# Patient Record
Sex: MALE | Race: WHITE | NOT HISPANIC OR LATINO | Employment: OTHER | URBAN - METROPOLITAN AREA
[De-identification: names, ages, dates, MRNs, and addresses within clinical notes are randomized per-mention and may not be internally consistent; named-entity substitution may affect disease eponyms.]

---

## 2017-01-05 ENCOUNTER — OFFICE VISIT (OUTPATIENT)
Dept: PULMONOLOGY | Facility: CLINIC | Age: 52
End: 2017-01-05

## 2017-01-05 VITALS
OXYGEN SATURATION: 97 % | RESPIRATION RATE: 16 BRPM | DIASTOLIC BLOOD PRESSURE: 72 MMHG | SYSTOLIC BLOOD PRESSURE: 118 MMHG | HEART RATE: 80 BPM | BODY MASS INDEX: 34.24 KG/M2 | TEMPERATURE: 97.8 F | WEIGHT: 252.8 LBS | HEIGHT: 72 IN

## 2017-01-05 DIAGNOSIS — I10 ESSENTIAL HYPERTENSION: ICD-10-CM

## 2017-01-05 DIAGNOSIS — G89.29 CHRONIC MIDLINE LOW BACK PAIN, WITH SCIATICA PRESENCE UNSPECIFIED: ICD-10-CM

## 2017-01-05 DIAGNOSIS — M54.5 CHRONIC MIDLINE LOW BACK PAIN, WITH SCIATICA PRESENCE UNSPECIFIED: ICD-10-CM

## 2017-01-05 DIAGNOSIS — D86.9 SARCOIDOSIS: Primary | ICD-10-CM

## 2017-01-05 PROCEDURE — 94375 RESPIRATORY FLOW VOLUME LOOP: CPT | Performed by: INTERNAL MEDICINE

## 2017-01-05 PROCEDURE — 94726 PLETHYSMOGRAPHY LUNG VOLUMES: CPT | Performed by: INTERNAL MEDICINE

## 2017-01-05 PROCEDURE — 94729 DIFFUSING CAPACITY: CPT | Performed by: INTERNAL MEDICINE

## 2017-01-05 PROCEDURE — 99214 OFFICE O/P EST MOD 30 MIN: CPT | Performed by: INTERNAL MEDICINE

## 2017-01-05 RX ORDER — SIMVASTATIN 40 MG
40 TABLET ORAL DAILY
COMMUNITY
Start: 2017-01-04

## 2017-01-05 RX ORDER — CHLORAL HYDRATE 500 MG
CAPSULE ORAL 2 TIMES DAILY
COMMUNITY
Start: 2013-05-20

## 2017-01-05 RX ORDER — SILDENAFIL CITRATE 20 MG/1
TABLET ORAL AS NEEDED
Refills: 0 | COMMUNITY
Start: 2016-11-15

## 2017-01-05 RX ORDER — HYDROCHLOROTHIAZIDE 25 MG/1
1 TABLET ORAL DAILY
COMMUNITY
Start: 2017-01-04

## 2017-01-05 RX ORDER — ASPIRIN 81 MG/1
1 TABLET ORAL DAILY
COMMUNITY
Start: 2017-01-04

## 2017-01-05 RX ORDER — GLIPIZIDE 5 MG/1
1 TABLET ORAL DAILY
COMMUNITY
Start: 2017-01-04

## 2017-01-05 RX ORDER — FLUTICASONE PROPIONATE 50 MCG
SPRAY, SUSPENSION (ML) NASAL AS NEEDED
COMMUNITY
Start: 2013-04-18

## 2017-01-05 RX ORDER — SITAGLIPTIN 100 MG/1
100 TABLET, FILM COATED ORAL DAILY
COMMUNITY
Start: 2017-01-04

## 2017-01-05 RX ORDER — ALLOPURINOL 300 MG/1
1 TABLET ORAL DAILY
COMMUNITY
Start: 2017-01-04

## 2017-01-05 RX ORDER — OMEPRAZOLE 20 MG/1
20 CAPSULE, DELAYED RELEASE ORAL DAILY
COMMUNITY
Start: 2017-01-04

## 2017-01-05 RX ORDER — LISINOPRIL 10 MG/1
10 TABLET ORAL DAILY
COMMUNITY
Start: 2017-01-04

## 2017-01-05 NOTE — MR AVS SNAPSHOT
Juan Frussel Beck   1/5/2017 11:15 AM   Office Visit    Dept Phone:  [phone]   Encounter #:  [id number]    Provider:  Himanshu Lee MD   Department:  [facility]                Your Full Care Plan              Your Updated Medication List          This list is accurate as of: 1/5/17 11:26 AM.  Always use your most recent med list.                allopurinol 300 MG tablet   Commonly known as:  ZYLOPRIM       ASPIR-LOW 81 MG EC tablet   Generic drug:  aspirin       fish oil 1000 MG capsule capsule       fluticasone 50 MCG/ACT nasal spray   Commonly known as:  FLONASE       glipiZIDE 5 MG tablet   Commonly known as:  GLUCOTROL       hydrochlorothiazide 25 MG tablet   Commonly known as:  HYDRODIURIL       JANUVIA 100 MG tablet   Generic drug:  SITagliptin       lisinopril 10 MG tablet   Commonly known as:  PRINIVIL,ZESTRIL       omeprazole 20 MG capsule   Commonly known as:  priLOSEC       ONE TOUCH ULTRA TEST test strip   Generic drug:  glucose blood       sildenafil 20 MG tablet   Commonly known as:  REVATIO       simvastatin 40 MG tablet   Commonly known as:  ZOCOR               We Performed the Following     Pulmonary Function Test     XR Chest PA & Lateral       You Were Diagnosed With        Codes Comments    Sarcoidosis    -  Primary ICD-10-CM: D86.9  ICD-9-CM: 135       Instructions     None    Patient Instructions History      Upcoming Appointments     Visit Type Date Time Department    FULL PFT 1/5/2017 10:30 AM [facility]    CHEST X-RAY 1/5/2017 11:00 AM [facility]    FOLLOW UP 1/5/2017 11:15 AM [facility]    XR CHEST PA AND LATERAL 1/5/2017 10:20 AM [facility]      MyChart Signup     Our records indicate that you have declined [facility] signup. If you would like to sign up for [facility], please email [email or web address] or call [phone] to obtain an activation code.              "  Other Info from Your Visit           Allergies     No Known Allergies      Reason for Visit     Sarcoidosis           Vital Signs     Blood Pressure Pulse Temperature Respirations Height Weight    118/72 80 97.8 °F (36.6 °C) 16 72\" (182.9 cm) 252 lb 12.8 oz (115 kg)    Oxygen Saturation Body Mass Index Smoking Status             97% 34.29 kg/m2 Never Smoker         Problems and Diagnoses Noted     Sarcoidosis      Results     Pulmonary Function Test           XR Chest PA & Lateral               "

## 2017-01-05 NOTE — LETTER
January 6, 2017     Yifan Andrews MD  [address]  [facility] [address]    Patient: Juan F Beck   YOB: 1965   Date of Visit: 1/5/2017       Dear Dr. Juliana MD:    Juan F Beck was in my office today. Below is a copy of my note.    If you have questions, please do not hesitate to call me. I look forward to following Juan F along with you.         Sincerely,        Himanshu Lee MD        CC: No Recipients      PULMONARY  NOTE    Chief Complaint     History of sarcoidosis, possible obstructive sleep apnea, chronic back pain, diabetes and hypertension    History of Present Illness     51-year-old white male returns today for follow-up.  I last saw him about a year ago.    He has a diagnosis of sarcoidosis.  He has had no evidence of systemic/organ involvement and has not been on treatment.  He is been followed annually with PFTs and a chest x-ray.    Since she was last seen he's had no acute exacerbation of a respiratory tract infection and has noted no regular dyspnea or cough.    He has had seasonal sinus congestion, drainage, and postnasal drip.  He's been using Flonase over-the-counter.    He has chronic joint Pain, particularly back pain.  This appears to be his most limiting and aggravating issue.    He has a history of diabetes and hypertension and remains on the same medications without change.    Patient Active Problem List   Diagnosis   • Obesity   • Anxiety   • Chronic lower back pain   • Dyslipidemia   • Gout   • Hypertension   • Sarcoidosis   • Diabetes     No Known Allergies    Current Outpatient Prescriptions:   •  allopurinol (ZYLOPRIM) 300 MG tablet, 1 tablet Daily., Disp: , Rfl:   •  ASPIR-LOW 81 MG EC tablet, 1 tablet Daily., Disp: , Rfl:   •  fluticasone (FLONASE) 50 MCG/ACT nasal spray, into each nostril As Needed., Disp: , Rfl:   •  glipiZIDE (GLUCOTROL) 5 MG tablet, 1 tablet Daily., Disp: , Rfl:   •  hydrochlorothiazide (HYDRODIURIL) 25 MG tablet, 1 tablet Daily., Disp:  ", Rfl:   •  JANUVIA 100 MG tablet, 1 tablet Daily., Disp: , Rfl:   •  lisinopril (PRINIVIL,ZESTRIL) 10 MG tablet, 1 tablet Daily., Disp: , Rfl:   •  Omega-3 Fatty Acids (FISH OIL) 1000 MG capsule capsule, Take  by mouth 2 (Two) Times a Day., Disp: , Rfl:   •  omeprazole (priLOSEC) 20 MG capsule, 1 capsule Daily., Disp: , Rfl:   •  ONE TOUCH ULTRA TEST test strip, , Disp: , Rfl:   •  sildenafil (REVATIO) 20 MG tablet, As Needed., Disp: , Rfl: 0  •  simvastatin (ZOCOR) 40 MG tablet, 1 tablet Daily., Disp: , Rfl:   MEDICATION LIST AND ALLERGIES REVIEWED.    Family History   Problem Relation Age of Onset   • Diabetes Mother    • Hypertension Mother      Social History   Substance Use Topics   • Smoking status: Never Smoker   • Smokeless tobacco: Not on file   • Alcohol use Yes     Social History     Social History Narrative   • No narrative on file     FAMILY AND SOCIAL HISTORY REVIEWED.    Review of Systems  ALSO REFER TO SCANNED ROS SHEET FROM SAME DATE.    Visit Vitals   • /72   • Pulse 80   • Temp 97.8 °F (36.6 °C)   • Resp 16   • Ht 72\" (182.9 cm)   • Wt 252 lb 12.8 oz (115 kg)   • SpO2 97%  Comment: RA   • BMI 34.29 kg/m2     Physical Exam   Constitutional: He is oriented to person, place, and time. He appears well-developed. No distress.   HENT:   Head: Normocephalic and atraumatic.   Neck: No thyromegaly present.   Cardiovascular: Normal rate, regular rhythm and normal heart sounds.    No murmur heard.  Pulmonary/Chest: Effort normal and breath sounds normal. No stridor.   Abdominal: Soft. Bowel sounds are normal.   Musculoskeletal: Normal range of motion. He exhibits no edema.   Lymphadenopathy:     He has no cervical adenopathy.        Right: No supraclavicular and no epitrochlear adenopathy present.        Left: No supraclavicular and no epitrochlear adenopathy present.   Neurological: He is alert and oriented to person, place, and time.   Skin: Skin is warm and dry. He is not diaphoretic. "   Psychiatric: He has a normal mood and affect. His behavior is normal.   Nursing note and vitals reviewed.      Results     PA and lateral chest x-ray was obtained and reveals no evidence of active disease    PFTs reveal no obstruction, no restriction, and a normal diffusion capacity.    Problem List       ICD-10-CM ICD-9-CM   1. Sarcoidosis D86.9 135   2. Chronic midline low back pain, with sciatica presence unspecified M54.5 724.2    G89.29 338.29   3. Essential hypertension I10 401.9       Discussion     We reviewed his test results today.  There is no evidence of active sarcoidosis.  I would not recommend any further workup at this point other than an annual ophthalmology evaluation.    I'll plan to see him back in one year.    I've encouraged continued use of an over-the-counter anti-histamine and nasal steroids for symptoms of allergic rhinitis.    I've offered to make a referral to pain management regarding his chronic back pain.  He hasn't interested at this time.    Himanshu Lee MD  Note electronically signed    CC: Yifan Andrews MD    Please note that portions of this note were completed with a voice recognition program. Efforts were made to edit the dictations, but occasionally words are mistranscribed.

## 2017-01-06 PROBLEM — E11.9 DIABETES (HCC): Status: ACTIVE | Noted: 2017-01-06

## 2017-01-06 NOTE — PROGRESS NOTES
PULMONARY  NOTE    Chief Complaint     History of sarcoidosis, possible obstructive sleep apnea, chronic back pain, diabetes and hypertension    History of Present Illness     51-year-old white male returns today for follow-up.  I last saw him about a year ago.    He has a diagnosis of sarcoidosis.  He has had no evidence of systemic/organ involvement and has not been on treatment.  He is been followed annually with PFTs and a chest x-ray.    Since she was last seen he's had no acute exacerbation of a respiratory tract infection and has noted no regular dyspnea or cough.    He has had seasonal sinus congestion, drainage, and postnasal drip.  He's been using Flonase over-the-counter.    He has chronic joint Pain, particularly back pain.  This appears to be his most limiting and aggravating issue.    He has a history of diabetes and hypertension and remains on the same medications without change.    Patient Active Problem List   Diagnosis   • Obesity   • Anxiety   • Chronic lower back pain   • Dyslipidemia   • Gout   • Hypertension   • Sarcoidosis   • Diabetes     No Known Allergies    Current Outpatient Prescriptions:   •  allopurinol (ZYLOPRIM) 300 MG tablet, 1 tablet Daily., Disp: , Rfl:   •  ASPIR-LOW 81 MG EC tablet, 1 tablet Daily., Disp: , Rfl:   •  fluticasone (FLONASE) 50 MCG/ACT nasal spray, into each nostril As Needed., Disp: , Rfl:   •  glipiZIDE (GLUCOTROL) 5 MG tablet, 1 tablet Daily., Disp: , Rfl:   •  hydrochlorothiazide (HYDRODIURIL) 25 MG tablet, 1 tablet Daily., Disp: , Rfl:   •  JANUVIA 100 MG tablet, 1 tablet Daily., Disp: , Rfl:   •  lisinopril (PRINIVIL,ZESTRIL) 10 MG tablet, 1 tablet Daily., Disp: , Rfl:   •  Omega-3 Fatty Acids (FISH OIL) 1000 MG capsule capsule, Take  by mouth 2 (Two) Times a Day., Disp: , Rfl:   •  omeprazole (priLOSEC) 20 MG capsule, 1 capsule Daily., Disp: , Rfl:   •  ONE TOUCH ULTRA TEST test strip, , Disp: , Rfl:   •  sildenafil (REVATIO) 20 MG tablet, As Needed.,  "Disp: , Rfl: 0  •  simvastatin (ZOCOR) 40 MG tablet, 1 tablet Daily., Disp: , Rfl:   MEDICATION LIST AND ALLERGIES REVIEWED.    Family History   Problem Relation Age of Onset   • Diabetes Mother    • Hypertension Mother      Social History   Substance Use Topics   • Smoking status: Never Smoker   • Smokeless tobacco: Not on file   • Alcohol use Yes     Social History     Social History Narrative   • No narrative on file     FAMILY AND SOCIAL HISTORY REVIEWED.    Review of Systems  ALSO REFER TO SCANNED ROS SHEET FROM SAME DATE.    Visit Vitals   • /72   • Pulse 80   • Temp 97.8 °F (36.6 °C)   • Resp 16   • Ht 72\" (182.9 cm)   • Wt 252 lb 12.8 oz (115 kg)   • SpO2 97%  Comment: RA   • BMI 34.29 kg/m2     Physical Exam   Constitutional: He is oriented to person, place, and time. He appears well-developed. No distress.   HENT:   Head: Normocephalic and atraumatic.   Neck: No thyromegaly present.   Cardiovascular: Normal rate, regular rhythm and normal heart sounds.    No murmur heard.  Pulmonary/Chest: Effort normal and breath sounds normal. No stridor.   Abdominal: Soft. Bowel sounds are normal.   Musculoskeletal: Normal range of motion. He exhibits no edema.   Lymphadenopathy:     He has no cervical adenopathy.        Right: No supraclavicular and no epitrochlear adenopathy present.        Left: No supraclavicular and no epitrochlear adenopathy present.   Neurological: He is alert and oriented to person, place, and time.   Skin: Skin is warm and dry. He is not diaphoretic.   Psychiatric: He has a normal mood and affect. His behavior is normal.   Nursing note and vitals reviewed.      Results     PA and lateral chest x-ray was obtained and reveals no evidence of active disease    PFTs reveal no obstruction, no restriction, and a normal diffusion capacity.    Problem List       ICD-10-CM ICD-9-CM   1. Sarcoidosis D86.9 135   2. Chronic midline low back pain, with sciatica presence unspecified M54.5 724.2    " G89.29 338.29   3. Essential hypertension I10 401.9       Discussion     We reviewed his test results today.  There is no evidence of active sarcoidosis.  I would not recommend any further workup at this point other than an annual ophthalmology evaluation.    I'll plan to see him back in one year.    I've encouraged continued use of an over-the-counter anti-histamine and nasal steroids for symptoms of allergic rhinitis.    I've offered to make a referral to pain management regarding his chronic back pain.  He hasn't interested at this time.    Himanshu Lee MD  Note electronically signed    CC: Yifan Andrews MD    Please note that portions of this note were completed with a voice recognition program. Efforts were made to edit the dictations, but occasionally words are mistranscribed.

## 2018-01-24 ENCOUNTER — OFFICE VISIT (OUTPATIENT)
Dept: PULMONOLOGY | Facility: CLINIC | Age: 53
End: 2018-01-24

## 2018-01-24 VITALS
HEIGHT: 72 IN | WEIGHT: 251.8 LBS | BODY MASS INDEX: 34.1 KG/M2 | TEMPERATURE: 98.2 F | RESPIRATION RATE: 16 BRPM | SYSTOLIC BLOOD PRESSURE: 116 MMHG | HEART RATE: 89 BPM | DIASTOLIC BLOOD PRESSURE: 78 MMHG | OXYGEN SATURATION: 96 %

## 2018-01-24 DIAGNOSIS — J30.89 SEASONAL AND PERENNIAL ALLERGIC RHINITIS: ICD-10-CM

## 2018-01-24 DIAGNOSIS — R06.02 SOB (SHORTNESS OF BREATH): ICD-10-CM

## 2018-01-24 DIAGNOSIS — D86.9 SARCOIDOSIS: ICD-10-CM

## 2018-01-24 DIAGNOSIS — J30.2 SEASONAL AND PERENNIAL ALLERGIC RHINITIS: ICD-10-CM

## 2018-01-24 PROCEDURE — 94729 DIFFUSING CAPACITY: CPT | Performed by: INTERNAL MEDICINE

## 2018-01-24 PROCEDURE — 94375 RESPIRATORY FLOW VOLUME LOOP: CPT | Performed by: INTERNAL MEDICINE

## 2018-01-24 PROCEDURE — 99214 OFFICE O/P EST MOD 30 MIN: CPT | Performed by: INTERNAL MEDICINE

## 2018-01-24 PROCEDURE — 94726 PLETHYSMOGRAPHY LUNG VOLUMES: CPT | Performed by: INTERNAL MEDICINE

## 2018-01-24 RX ORDER — PIOGLITAZONEHYDROCHLORIDE 15 MG/1
15 TABLET ORAL DAILY
COMMUNITY
Start: 2018-01-18

## 2018-01-24 RX ORDER — DICLOFENAC SODIUM 25 MG/1
TABLET, DELAYED RELEASE ORAL AS NEEDED
COMMUNITY
Start: 2017-12-26 | End: 2020-12-02

## 2018-01-24 RX ORDER — INSULIN GLARGINE 100 [IU]/ML
10 INJECTION, SOLUTION SUBCUTANEOUS DAILY
COMMUNITY
Start: 2017-11-24

## 2018-01-25 PROBLEM — J30.2 SEASONAL AND PERENNIAL ALLERGIC RHINITIS: Status: ACTIVE | Noted: 2018-01-25

## 2018-01-25 PROBLEM — J30.89 SEASONAL AND PERENNIAL ALLERGIC RHINITIS: Status: ACTIVE | Noted: 2018-01-25

## 2018-01-25 NOTE — PROGRESS NOTES
"  PULMONARY  NOTE    Chief Complaint     History of sarcoidosis, perennial rhinitis, diabetes, hypertension    History of Present Illness     52-year-old white male returns today for follow-up.  He was last seen about a year ago.    He carries a diagnosis of sarcoidosis but has not been on systemic treatment and has had no evidence of active pulmonary sarcoidosis.    He returns today indicating that he is done well this last year from a respiratory standpoint.  No recent acute respiratory tract infections.    He does have what was seasonal and is now more perennial sinus drainage and postnasal drip.  He has used over-the-counter Flonase and an antihistamine for this.    He has had chronic joint issues including chronic back pain but is also been diagnosed with a \"frozen\" left shoulder.  He has been doing physical therapy.    Patient Active Problem List   Diagnosis   • Obesity   • Anxiety   • Chronic lower back pain   • Dyslipidemia   • Gout   • Hypertension   • Sarcoidosis   • Diabetes   • Perennial rhinitis     No Known Allergies    Current Outpatient Prescriptions:   •  allopurinol (ZYLOPRIM) 300 MG tablet, 1 tablet Daily., Disp: , Rfl:   •  ASPIR-LOW 81 MG EC tablet, 1 tablet Daily., Disp: , Rfl:   •  fluticasone (FLONASE) 50 MCG/ACT nasal spray, into each nostril As Needed., Disp: , Rfl:   •  glipiZIDE (GLUCOTROL) 5 MG tablet, 1 tablet Daily., Disp: , Rfl:   •  hydrochlorothiazide (HYDRODIURIL) 25 MG tablet, 1 tablet Daily., Disp: , Rfl:   •  Insulin Glargine (BASAGLAR KWIKPEN) 100 UNIT/ML injection pen, 10 Units Daily., Disp: , Rfl:   •  JANUVIA 100 MG tablet, 1 tablet Daily., Disp: , Rfl:   •  lisinopril (PRINIVIL,ZESTRIL) 10 MG tablet, 1 tablet Daily., Disp: , Rfl:   •  Omega-3 Fatty Acids (FISH OIL) 1000 MG capsule capsule, Take  by mouth 2 (Two) Times a Day., Disp: , Rfl:   •  omeprazole (priLOSEC) 20 MG capsule, 1 capsule Daily., Disp: , Rfl:   •  ONE TOUCH ULTRA TEST test strip, , Disp: , Rfl:   •  " "pioglitazone (ACTOS) 15 MG tablet, 1 tablet Daily., Disp: , Rfl:   •  sildenafil (REVATIO) 20 MG tablet, As Needed., Disp: , Rfl: 0  •  simvastatin (ZOCOR) 40 MG tablet, 1 tablet Daily., Disp: , Rfl:   •  diclofenac (VOLTAREN) 25 MG EC tablet, As Needed., Disp: , Rfl:   MEDICATION LIST AND ALLERGIES REVIEWED.    Family History   Problem Relation Age of Onset   • Diabetes Mother    • Hypertension Mother      Social History   Substance Use Topics   • Smoking status: Never Smoker   • Smokeless tobacco: Never Used   • Alcohol use No     Social History     Social History Narrative     FAMILY AND SOCIAL HISTORY REVIEWED.    Review of Systems  ALSO REFER TO SCANNED ROS SHEET FROM SAME DATE.    /78 (BP Location: Right arm, Patient Position: Sitting, Cuff Size: Adult)  Pulse 89  Temp 98.2 °F (36.8 °C)  Resp 16  Ht 182.9 cm (72\")  Wt 114 kg (251 lb 12.8 oz)  SpO2 96% Comment: RA  BMI 34.15 kg/m2  Physical Exam   Constitutional: He is oriented to person, place, and time. He appears well-developed. No distress.   HENT:   Head: Normocephalic and atraumatic.   Neck: No thyromegaly present.   Cardiovascular: Normal rate, regular rhythm and normal heart sounds.    No murmur heard.  Pulmonary/Chest: Effort normal and breath sounds normal. No stridor.   Abdominal: Soft. Bowel sounds are normal.   Musculoskeletal: Normal range of motion. He exhibits no edema.   Lymphadenopathy:     He has no cervical adenopathy.        Right: No supraclavicular and no epitrochlear adenopathy present.        Left: No supraclavicular and no epitrochlear adenopathy present.   Neurological: He is alert and oriented to person, place, and time.   Skin: Skin is warm and dry. He is not diaphoretic.   Psychiatric: He has a normal mood and affect. His behavior is normal.   Nursing note and vitals reviewed.      Results     PFTs reveal no airway obstruction, no restriction, and a normal diffusion capacity.    PA and lateral chest x-ray was obtained " reveals no evidence of active disease    Problem List       ICD-10-CM ICD-9-CM   1. Sarcoidosis D86.9 135   2. Perennial rhinitis J30.89 477.9    J30.2    3. H/O SOB R06.02 786.05       Discussion     We reviewed his test results.  He has no evidence of active sarcoidosis.  He has no evidence of a restrictive or obstructive pulmonary defect.    I've encouraged continued physical therapy for his left shoulder issue.    He will continue to use Flonase and over-the-counter antihistamine for sinus symptoms.    I've recommended an annual ophthalmology evaluation given his history of sarcoidosis    I'll plan to see him back in a year or earlier if there are problems in the meantime    Himanshu Lee MD  Note electronically signed    CC: Yifan Andrews MD

## 2019-04-15 ENCOUNTER — OFFICE VISIT (OUTPATIENT)
Dept: PULMONOLOGY | Facility: CLINIC | Age: 54
End: 2019-04-15

## 2019-04-15 VITALS
DIASTOLIC BLOOD PRESSURE: 80 MMHG | HEART RATE: 80 BPM | HEIGHT: 70 IN | TEMPERATURE: 97.6 F | BODY MASS INDEX: 35.48 KG/M2 | SYSTOLIC BLOOD PRESSURE: 118 MMHG | WEIGHT: 247.8 LBS | OXYGEN SATURATION: 98 %

## 2019-04-15 DIAGNOSIS — J30.2 SEASONAL AND PERENNIAL ALLERGIC RHINITIS: ICD-10-CM

## 2019-04-15 DIAGNOSIS — J30.89 SEASONAL AND PERENNIAL ALLERGIC RHINITIS: ICD-10-CM

## 2019-04-15 DIAGNOSIS — E66.09 CLASS 2 OBESITY DUE TO EXCESS CALORIES WITH BODY MASS INDEX (BMI) OF 35.0 TO 35.9 IN ADULT, UNSPECIFIED WHETHER SERIOUS COMORBIDITY PRESENT: ICD-10-CM

## 2019-04-15 DIAGNOSIS — D86.9 SARCOIDOSIS: Primary | ICD-10-CM

## 2019-04-15 PROCEDURE — 99214 OFFICE O/P EST MOD 30 MIN: CPT | Performed by: INTERNAL MEDICINE

## 2019-04-15 PROCEDURE — 94729 DIFFUSING CAPACITY: CPT | Performed by: INTERNAL MEDICINE

## 2019-04-15 PROCEDURE — 94375 RESPIRATORY FLOW VOLUME LOOP: CPT | Performed by: INTERNAL MEDICINE

## 2019-04-15 PROCEDURE — 94726 PLETHYSMOGRAPHY LUNG VOLUMES: CPT | Performed by: INTERNAL MEDICINE

## 2019-04-15 RX ORDER — METHOCARBAMOL 500 MG/1
TABLET, FILM COATED ORAL
Refills: 5 | COMMUNITY
Start: 2019-03-18

## 2019-04-15 NOTE — PROGRESS NOTES
PULMONARY  NOTE    Chief Complaint     History of sarcoidosis, perennial rhinitis, diabetes, hypertension, obesity    History of Present Illness     53-year-old white male returns today for follow-up.  I last saw him January 2018.    He has a history of sarcoidosis for which he has not required systemic therapy.  He has had little in the way of respiratory symptoms in the past and well-preserved lung function.    He returns today indicating that he continues to have chronic back pain.  He is currently on no regular narcotic pain medications.  He has seen pain management in the past, however.    He has seasonal sinus drainage and postnasal drip for which she primarily takes Flonase and an antihistamine.    He has had no regular cough or sputum production.  He has not had an ophthalmology evaluation for over a year.    He has not been able to make any progress at weight loss and indicates that his regular activity is limited by his joint pain.  According to our records his weight is down by 4 pounds in comparison to last year.    Patient Active Problem List   Diagnosis   • Obesity   • Anxiety   • Chronic lower back pain   • Dyslipidemia   • Gout   • Hypertension   • Sarcoidosis   • Diabetes (CMS/[facility])   • Perennial rhinitis     No Known Allergies    Current Outpatient Medications:   •  allopurinol (ZYLOPRIM) 300 MG tablet, 1 tablet Daily., Disp: , Rfl:   •  ASPIR-LOW 81 MG EC tablet, 1 tablet Daily., Disp: , Rfl:   •  diclofenac (VOLTAREN) 25 MG EC tablet, As Needed., Disp: , Rfl:   •  fluticasone (FLONASE) 50 MCG/ACT nasal spray, into each nostril As Needed., Disp: , Rfl:   •  glipiZIDE (GLUCOTROL) 5 MG tablet, 1 tablet Daily., Disp: , Rfl:   •  hydrochlorothiazide (HYDRODIURIL) 25 MG tablet, 1 tablet Daily., Disp: , Rfl:   •  Insulin Glargine (BASAGLAR KWIKPEN) 100 UNIT/ML injection pen, 10 Units Daily., Disp: , Rfl:   •  JANUVIA 100 MG tablet, 1 tablet Daily., Disp: , Rfl:   •  lisinopril (PRINIVIL,ZESTRIL) 10 MG  "tablet, 1 tablet Daily., Disp: , Rfl:   •  methocarbamol (ROBAXIN) 500 MG tablet, TAKE 1 TABLET BY MOUTH TWO TIMES A DAY FOR MUSCLE SPASMS, Disp: , Rfl: 5  •  Omega-3 Fatty Acids (FISH OIL) 1000 MG capsule capsule, Take  by mouth 2 (Two) Times a Day., Disp: , Rfl:   •  omeprazole (priLOSEC) 20 MG capsule, 1 capsule Daily., Disp: , Rfl:   •  ONE TOUCH ULTRA TEST test strip, , Disp: , Rfl:   •  pioglitazone (ACTOS) 15 MG tablet, 1 tablet Daily., Disp: , Rfl:   •  sildenafil (REVATIO) 20 MG tablet, As Needed., Disp: , Rfl: 0  •  simvastatin (ZOCOR) 40 MG tablet, 1 tablet Daily., Disp: , Rfl:   MEDICATION LIST AND ALLERGIES REVIEWED.    Family History   Problem Relation Age of Onset   • Diabetes Mother    • Hypertension Mother      Social History     Tobacco Use   • Smoking status: Never Smoker   • Smokeless tobacco: Never Used   Substance Use Topics   • Alcohol use: No   • Drug use: No     Social History     Social History Narrative   • Not on file     FAMILY AND SOCIAL HISTORY REVIEWED.    Review of Systems  ALSO REFER TO SCANNED ROS SHEET FROM SAME DATE.    /80   Pulse 80   Temp 97.6 °F (36.4 °C)   Ht 177.8 cm (70\")   Wt 112 kg (247 lb 12.8 oz)   SpO2 98% Comment: resting, room air  BMI 35.56 kg/m²   Physical Exam   Constitutional: He is oriented to person, place, and time. He appears well-developed. No distress.   HENT:   Head: Normocephalic and atraumatic.   Neck: No thyromegaly present.   Cardiovascular: Normal rate, regular rhythm and normal heart sounds.   No murmur heard.  Pulmonary/Chest: Effort normal and breath sounds normal. No stridor.   Abdominal: Soft. Bowel sounds are normal.   Musculoskeletal: Normal range of motion. He exhibits no edema.   Lymphadenopathy:     He has no cervical adenopathy.        Right: No supraclavicular and no epitrochlear adenopathy present.        Left: No supraclavicular and no epitrochlear adenopathy present.   Neurological: He is alert and oriented to person, " place, and time.   Skin: Skin is warm and dry. He is not diaphoretic.   Psychiatric: He has a normal mood and affect. His behavior is normal.   Nursing note and vitals reviewed.      Results     PA and lateral chest x-ray reveals no evidence of active disease.    PFTs reveal no airway obstruction, no restriction, and a normal diffusion capacity    Problem List       ICD-10-CM ICD-9-CM   1. Sarcoidosis D86.9 135   2. Perennial rhinitis J30.89 477.9    J30.2    3. Class 2 obesity  E66.09 278.00    Z68.35 V85.35       Discussion     We reviewed his test results.  At this point he has no evidence of active sarcoidosis.  I have reinforced the need to have an annual ophthalmology evaluation, however.    Assuming he remains stable I will plan to see him back in 1 year or earlier if there are any problems in the meantime.    He is going to remain on the same medications for seasonal allergic rhinitis which primarily is OTC fluticasone and antihistamine    I recommended regular exercise and efforts at weight loss    Himanshu Lee MD  Note electronically signed    CC: Yifan Andrews MD

## 2020-05-04 ENCOUNTER — TELEMEDICINE (OUTPATIENT)
Dept: PULMONOLOGY | Facility: CLINIC | Age: 55
End: 2020-05-04

## 2020-05-04 DIAGNOSIS — J30.2 SEASONAL AND PERENNIAL ALLERGIC RHINITIS: ICD-10-CM

## 2020-05-04 DIAGNOSIS — E66.09 CLASS 2 OBESITY DUE TO EXCESS CALORIES WITH BODY MASS INDEX (BMI) OF 35.0 TO 35.9 IN ADULT, UNSPECIFIED WHETHER SERIOUS COMORBIDITY PRESENT: ICD-10-CM

## 2020-05-04 DIAGNOSIS — G89.29 CHRONIC MIDLINE LOW BACK PAIN WITH BILATERAL SCIATICA: ICD-10-CM

## 2020-05-04 DIAGNOSIS — D86.9 SARCOIDOSIS: Primary | ICD-10-CM

## 2020-05-04 DIAGNOSIS — J30.89 SEASONAL AND PERENNIAL ALLERGIC RHINITIS: ICD-10-CM

## 2020-05-04 DIAGNOSIS — M54.41 CHRONIC MIDLINE LOW BACK PAIN WITH BILATERAL SCIATICA: ICD-10-CM

## 2020-05-04 DIAGNOSIS — M54.42 CHRONIC MIDLINE LOW BACK PAIN WITH BILATERAL SCIATICA: ICD-10-CM

## 2020-05-04 PROCEDURE — 99213 OFFICE O/P EST LOW 20 MIN: CPT | Performed by: INTERNAL MEDICINE

## 2020-05-05 DIAGNOSIS — G89.29 CHRONIC LOW BACK PAIN WITHOUT SCIATICA, UNSPECIFIED BACK PAIN LATERALITY: Primary | ICD-10-CM

## 2020-05-05 DIAGNOSIS — M54.50 CHRONIC LOW BACK PAIN WITHOUT SCIATICA, UNSPECIFIED BACK PAIN LATERALITY: Primary | ICD-10-CM

## 2020-05-05 NOTE — PROGRESS NOTES
[facility] PULMONARY  VIDEO VISIT NOTE    Chief Complaint     Sarcoidosis, perennial rhinitis, diabetes, hypertension, obesity, chronic low back pain  Consent for Video Visit was obtained via [employer].  History of Present Illness     54-year-old white male last seen in our office on 4/15/2019.    He has a history of sarcoidosis.  He has not required systemic therapy in the past.  Last evaluation about a year ago revealed well-preserved lung function and no evidence of active disease.    Since I last saw him he has done about the same with no new respiratory symptoms.  No regular cough or sputum production.  No dyspnea.  No rashes.  He has not seen an ophthalmologist yet this year.    He continues to have sinus drainage and postnasal drip for which he takes Flonase and an antihistamine and these seem to help with his symptoms.    His primary complaint is chronic low back pain.  Currently not being followed by pain management.  He is not on any narcotics.  This pain continues to be is primary issue and he would like to be seen by a rheumatologist to evaluate for possible rheumatoid arthritis.    Previously had been working on weight loss but he indicates that his weight is been stable since I last saw him.    Patient Active Problem List   Diagnosis   • Obesity   • Anxiety   • Chronic lower back pain   • Dyslipidemia   • Gout   • Hypertension   • Sarcoidosis   • Diabetes (CMS/HCC)   • Perennial rhinitis     No Known Allergies    Current Outpatient Medications:   •  allopurinol (ZYLOPRIM) 300 MG tablet, 1 tablet Daily., Disp: , Rfl:   •  ASPIR-LOW 81 MG EC tablet, 1 tablet Daily., Disp: , Rfl:   •  diclofenac (VOLTAREN) 25 MG EC tablet, As Needed., Disp: , Rfl:   •  fluticasone (FLONASE) 50 MCG/ACT nasal spray, into each nostril As Needed., Disp: , Rfl:   •  glipiZIDE (GLUCOTROL) 5 MG tablet, 1 tablet Daily., Disp: , Rfl:   •  hydrochlorothiazide (HYDRODIURIL) 25 MG tablet, 1 tablet Daily., Disp: , Rfl:   •  Insulin  Glargine (BASAGLAR KWIKPEN) 100 UNIT/ML injection pen, 10 Units Daily., Disp: , Rfl:   •  JANUVIA 100 MG tablet, 1 tablet Daily., Disp: , Rfl:   •  lisinopril (PRINIVIL,ZESTRIL) 10 MG tablet, 1 tablet Daily., Disp: , Rfl:   •  methocarbamol (ROBAXIN) 500 MG tablet, TAKE 1 TABLET BY MOUTH TWO TIMES A DAY FOR MUSCLE SPASMS, Disp: , Rfl: 5  •  Omega-3 Fatty Acids (FISH OIL) 1000 MG capsule capsule, Take  by mouth 2 (Two) Times a Day., Disp: , Rfl:   •  omeprazole (priLOSEC) 20 MG capsule, 1 capsule Daily., Disp: , Rfl:   •  ONE TOUCH ULTRA TEST test strip, , Disp: , Rfl:   •  pioglitazone (ACTOS) 15 MG tablet, 1 tablet Daily., Disp: , Rfl:   •  sildenafil (REVATIO) 20 MG tablet, As Needed., Disp: , Rfl: 0  •  simvastatin (ZOCOR) 40 MG tablet, 1 tablet Daily., Disp: , Rfl:   MEDICATION LIST AND ALLERGIES REVIEWED.    Family History   Problem Relation Age of Onset   • Diabetes Mother    • Hypertension Mother      Social History     Tobacco Use   • Smoking status: Never Smoker   • Smokeless tobacco: Never Used   Substance Use Topics   • Alcohol use: No   • Drug use: No     Social History     Social History Narrative   • Not on file     FAMILY AND SOCIAL HISTORY REVIEWED.    Review of Systems   Constitutional: Negative.  Negative for fatigue and unexpected weight change.   HENT: Positive for postnasal drip and rhinorrhea.    Respiratory: Negative for cough, shortness of breath and wheezing.    Cardiovascular: Negative.  Negative for chest pain, palpitations and leg swelling.   Gastrointestinal: Negative for abdominal distention and nausea.   Musculoskeletal: Positive for arthralgias and back pain. Negative for joint swelling.   Skin: Negative.  Negative for rash and wound.   Allergic/Immunologic: Negative.  Negative for immunocompromised state.   Hematological: Negative.  Negative for adenopathy.   Psychiatric/Behavioral: Negative.  Negative for sleep disturbance.     ALSO REFER TO SCANNED ROS SHEET FROM SAME  DATE.    There were no vitals taken for this visit.  Physical Exam    Results       Problem List       ICD-10-CM ICD-9-CM   1. Sarcoidosis D86.9 135   2. Perennial rhinitis J30.89 477.9    J30.2    3. Class 2 obesity  E66.09 278.00    Z68.35 V85.35   4. Chronic low back pain M54.41 724.2    M54.42 724.3    G89.29 338.29       Discussion     He appears stable from a pulmonary standpoint.  He is continuing to have low back pain and is asking about a referral to rheumatology for evaluation.  We will facilitate a referral to the arthritis Center.    I have encouraged his efforts at regular exercise and weight loss.    I will plan to see him back in the fall with pulmonary function tests and chest imaging to follow-up on his history of sarcoidosis.    We will continue to use OTC medications for his sinus symptoms    Himanshu Lee MD  Note electronically signed    CC: Yifan Andrews MD

## 2020-05-08 ENCOUNTER — TRANSCRIBE ORDERS (OUTPATIENT)
Dept: PULMONOLOGY | Facility: CLINIC | Age: 55
End: 2020-05-08

## 2020-12-02 ENCOUNTER — OFFICE VISIT (OUTPATIENT)
Dept: PULMONOLOGY | Facility: CLINIC | Age: 55
End: 2020-12-02

## 2020-12-02 VITALS
WEIGHT: 247 LBS | TEMPERATURE: 98.2 F | SYSTOLIC BLOOD PRESSURE: 120 MMHG | DIASTOLIC BLOOD PRESSURE: 80 MMHG | OXYGEN SATURATION: 96 % | BODY MASS INDEX: 35.36 KG/M2 | HEART RATE: 82 BPM | HEIGHT: 70 IN

## 2020-12-02 DIAGNOSIS — J30.89 SEASONAL AND PERENNIAL ALLERGIC RHINITIS: ICD-10-CM

## 2020-12-02 DIAGNOSIS — J30.2 SEASONAL AND PERENNIAL ALLERGIC RHINITIS: ICD-10-CM

## 2020-12-02 DIAGNOSIS — E66.9 OBESITY, CLASS II, BMI 35-39.9: ICD-10-CM

## 2020-12-02 DIAGNOSIS — D86.9 SARCOIDOSIS: Primary | ICD-10-CM

## 2020-12-02 DIAGNOSIS — Z23 IMMUNIZATION DUE: ICD-10-CM

## 2020-12-02 PROBLEM — E66.812 OBESITY, CLASS II, BMI 35-39.9: Status: ACTIVE | Noted: 2020-12-02

## 2020-12-02 PROCEDURE — 99214 OFFICE O/P EST MOD 30 MIN: CPT | Performed by: INTERNAL MEDICINE

## 2020-12-02 PROCEDURE — 90471 IMMUNIZATION ADMIN: CPT | Performed by: INTERNAL MEDICINE

## 2020-12-02 PROCEDURE — 90686 IIV4 VACC NO PRSV 0.5 ML IM: CPT | Performed by: INTERNAL MEDICINE

## 2020-12-02 RX ORDER — GABAPENTIN 300 MG/1
300 CAPSULE ORAL 3 TIMES DAILY
COMMUNITY
Start: 2020-11-24

## 2020-12-02 RX ORDER — MELOXICAM 15 MG/1
TABLET ORAL
COMMUNITY
Start: 2020-11-13

## 2020-12-02 NOTE — PROGRESS NOTES
PULMONARY  NOTE    Chief Complaint     Sarcoidosis, perennial rhinitis, diabetes, hypertension, obesity class II, chronic joint pain    History of Present Illness     55-year-old male with whom I last visited via video on 5/4/2020    He has a history of sarcoidosis.  He has not required systemic therapy.  Previous PFTs revealed preserved lung function with no evidence of active disease    He has been having an annual ophthalmology evaluation and no evidence of ocular sarcoid has been identified    He has a periodic cough but not on a daily basis    He has intermittent dyspnea and wheezing on exertion.  This does not occur daily.  He is on no inhalers.    He has a regular sinus congestion, drainage, and postnasal drip.  He typically uses Flonase with an antihistamine    He continues to have ongoing arthritic related complaints.  He saw a rheumatologist locally and was prescribed Celebrex which he felt worsened his breathing so he stopped it.  No other follow-up with that rheumatologist as planned.    Patient Active Problem List   Diagnosis   • Anxiety   • Chronic lower back pain   • Dyslipidemia   • Gout   • Hypertension   • Sarcoidosis   • Diabetes (CMS/[facility])   • Perennial rhinitis   • Obesity, Class II, BMI 35-39.9     No Known Allergies    Current Outpatient Medications:   •  allopurinol (ZYLOPRIM) 300 MG tablet, 1 tablet Daily., Disp: , Rfl:   •  ASPIR-LOW 81 MG EC tablet, 1 tablet Daily., Disp: , Rfl:   •  fluticasone (FLONASE) 50 MCG/ACT nasal spray, into each nostril As Needed., Disp: , Rfl:   •  gabapentin (NEURONTIN) 300 MG capsule, , Disp: , Rfl:   •  glipiZIDE (GLUCOTROL) 5 MG tablet, 1 tablet Daily., Disp: , Rfl:   •  hydrochlorothiazide (HYDRODIURIL) 25 MG tablet, 1 tablet Daily., Disp: , Rfl:   •  Insulin Glargine (BASAGLAR KWIKPEN) 100 UNIT/ML injection pen, 10 Units Daily., Disp: , Rfl:   •  JANUVIA 100 MG tablet, 1 tablet Daily., Disp: , Rfl:   •  lisinopril (PRINIVIL,ZESTRIL) 10 MG tablet, 1 tablet  "Daily., Disp: , Rfl:   •  meloxicam (MOBIC) 15 MG tablet, , Disp: , Rfl:   •  methocarbamol (ROBAXIN) 500 MG tablet, TAKE 1 TABLET BY MOUTH TWO TIMES A DAY FOR MUSCLE SPASMS, Disp: , Rfl: 5  •  Omega-3 Fatty Acids (FISH OIL) 1000 MG capsule capsule, Take  by mouth 2 (Two) Times a Day., Disp: , Rfl:   •  omeprazole (priLOSEC) 20 MG capsule, 1 capsule Daily., Disp: , Rfl:   •  ONE TOUCH ULTRA TEST test strip, , Disp: , Rfl:   •  pioglitazone (ACTOS) 15 MG tablet, 1 tablet Daily., Disp: , Rfl:   •  sildenafil (REVATIO) 20 MG tablet, As Needed., Disp: , Rfl: 0  •  simvastatin (ZOCOR) 40 MG tablet, 1 tablet Daily., Disp: , Rfl:   MEDICATION LIST AND ALLERGIES REVIEWED.    Family History   Problem Relation Age of Onset   • Diabetes Mother    • Hypertension Mother      Social History     Tobacco Use   • Smoking status: Never Smoker   • Smokeless tobacco: Never Used   Substance Use Topics   • Alcohol use: No   • Drug use: No     Social History     Social History Narrative   • Not on file     FAMILY AND SOCIAL HISTORY REVIEWED.    Review of Systems  ALSO REFER TO SCANNED ROS SHEET FROM SAME DATE.    /80   Pulse 82   Temp 98.2 °F (36.8 °C)   Ht 177.8 cm (70\")   Wt 112 kg (247 lb)   SpO2 96% Comment: resting, room air  BMI 35.44 kg/m²   Physical Exam  Vitals signs and nursing note reviewed.   Constitutional:       General: He is not in acute distress.     Appearance: He is well-developed. He is not diaphoretic.   HENT:      Head: Normocephalic and atraumatic.   Neck:      Thyroid: No thyromegaly.   Cardiovascular:      Rate and Rhythm: Normal rate and regular rhythm.      Heart sounds: Normal heart sounds. No murmur.   Pulmonary:      Effort: Pulmonary effort is normal.      Breath sounds: Normal breath sounds. No stridor.   Abdominal:      General: Bowel sounds are normal.      Palpations: Abdomen is soft.   Musculoskeletal: Normal range of motion.      Right lower leg: No edema.      Left lower leg: No edema. "   Lymphadenopathy:      Cervical: No cervical adenopathy.      Upper Body:      Right upper body: No supraclavicular or epitrochlear adenopathy.      Left upper body: No supraclavicular or epitrochlear adenopathy.   Skin:     General: Skin is warm and dry.   Neurological:      Mental Status: He is alert and oriented to person, place, and time.   Psychiatric:         Behavior: Behavior normal.         Results     PA/Lat chest x-ray reveals no evidence of active disease    Problem List       ICD-10-CM ICD-9-CM   1. Sarcoidosis  D86.9 135   2. Perennial rhinitis  J30.89 477.9    J30.2    3. Obesity, Class II, BMI 35-39.9  E66.9 278.00   4. Immunization due  Z23 V05.9       Discussion     We reviewed his chest x-ray.  No active disease noted.  No evidence of active sarcoidosis    I have encouraged continued annual follow-up with his ophthalmologist    Have encouraged continued use of OTC medications for his perennial rhinitis symptoms.    For his periodic dyspnea and wheezing I will prescribe albuterol to use on an as-needed basis.  He does not have symptoms on a daily basis.    He continues to have quite prolific complaints of joint pain.  He would like to see another rheumatologist.  We will facilitate a referral to the [facility].    I will plan to see him back next year    Himanshu Lee MD  Note electronically signed    CC: Yifan Andrews MD

## 2020-12-03 DIAGNOSIS — M05.10 RHEUMATOID LUNG DISEASE WITH RHEUMATOID ARTHRITIS (HCC): Primary | ICD-10-CM

## 2021-03-12 ENCOUNTER — IMMUNIZATION (OUTPATIENT)
Dept: VACCINE CLINIC | Facility: HOSPITAL | Age: 56
End: 2021-03-12

## 2021-03-12 PROCEDURE — 91300 HC SARSCOV02 VAC 30MCG/0.3ML IM: CPT | Performed by: INTERNAL MEDICINE

## 2021-03-12 PROCEDURE — 0001A: CPT | Performed by: INTERNAL MEDICINE

## 2021-03-29 ENCOUNTER — IMMUNIZATION (OUTPATIENT)
Dept: VACCINE CLINIC | Facility: HOSPITAL | Age: 56
End: 2021-03-29

## 2021-03-29 PROCEDURE — 0002A: CPT | Performed by: INTERNAL MEDICINE

## 2021-03-29 PROCEDURE — 91300 HC SARSCOV02 VAC 30MCG/0.3ML IM: CPT | Performed by: INTERNAL MEDICINE

## 2021-04-02 ENCOUNTER — APPOINTMENT (OUTPATIENT)
Dept: VACCINE CLINIC | Facility: HOSPITAL | Age: 56
End: 2021-04-02

## 2021-07-07 ENCOUNTER — OFFICE VISIT (OUTPATIENT)
Dept: PULMONOLOGY | Facility: CLINIC | Age: 56
End: 2021-07-07

## 2021-07-07 VITALS
HEART RATE: 80 BPM | RESPIRATION RATE: 18 BRPM | OXYGEN SATURATION: 96 % | DIASTOLIC BLOOD PRESSURE: 70 MMHG | WEIGHT: 241.25 LBS | HEIGHT: 70 IN | TEMPERATURE: 98.6 F | BODY MASS INDEX: 34.54 KG/M2 | SYSTOLIC BLOOD PRESSURE: 108 MMHG

## 2021-07-07 DIAGNOSIS — J30.89 SEASONAL AND PERENNIAL ALLERGIC RHINITIS: ICD-10-CM

## 2021-07-07 DIAGNOSIS — D86.9 SARCOIDOSIS: Primary | ICD-10-CM

## 2021-07-07 DIAGNOSIS — E66.9 OBESITY, CLASS II, BMI 35-39.9: ICD-10-CM

## 2021-07-07 DIAGNOSIS — J30.2 SEASONAL AND PERENNIAL ALLERGIC RHINITIS: ICD-10-CM

## 2021-07-07 PROCEDURE — 99214 OFFICE O/P EST MOD 30 MIN: CPT | Performed by: INTERNAL MEDICINE

## 2021-07-07 RX ORDER — HYDROCODONE BITARTRATE AND ACETAMINOPHEN 5; 325 MG/1; MG/1
TABLET ORAL
COMMUNITY
Start: 2021-06-08

## 2021-07-07 RX ORDER — INSULIN GLARGINE 100 [IU]/ML
INJECTION, SOLUTION SUBCUTANEOUS
COMMUNITY

## 2021-07-07 RX ORDER — MELOXICAM 7.5 MG/1
TABLET ORAL
COMMUNITY

## 2021-07-07 RX ORDER — ALBUTEROL SULFATE 90 UG/1
AEROSOL, METERED RESPIRATORY (INHALATION)
COMMUNITY
Start: 2021-04-28

## 2021-07-07 RX ORDER — DICLOFENAC SODIUM 25 MG/1
TABLET, DELAYED RELEASE ORAL
COMMUNITY

## 2021-07-07 NOTE — PROGRESS NOTES
"  PULMONARY  NOTE    Chief Complaint     Sarcoidosis, perennial rhinitis, diabetes, hypertension, obesity class II, chronic joint pain    History of Present Illness     55-year-old male returns today for follow-up  I last saw him 12/2/2020    He has a history of sarcoidosis that has not required systemic therapy  Her most recent PFTs revealed preserved lung function    I have recommended annual ophthalmology evaluation  He has not been back in about a year    He has had chronic pain issues  On his last visit we try to refer him to rheumatology here in [place] for a second opinion  It took him quite a while to get an appointment and by then he had already started seeing pain management.    He primarily complains of sore throat and he is hoarse at times.  He also feels as though his throat \"closes down\".  He has not been to see an ENT physician    Patient Active Problem List   Diagnosis   • Anxiety   • Chronic lower back pain   • Dyslipidemia   • Gout   • Hypertension   • Sarcoidosis   • Diabetes (CMS/HCC)   • Perennial rhinitis   • Obesity, Class II, BMI 35-39.9     Allergies   Allergen Reactions   • Celecoxib Swelling       Current Outpatient Medications:   •  allopurinol (ZYLOPRIM) 300 MG tablet, 1 tablet Daily., Disp: , Rfl:   •  ASPIR-LOW 81 MG EC tablet, 1 tablet Daily., Disp: , Rfl:   •  diclofenac (VOLTAREN) 25 MG EC tablet, diclofenac sodium 25 mg tablet,delayed release  TAKE 1 TABLET BY MOUTH TWO TIMES A DAY FOR PAIN OR INFLAMMATION, Disp: , Rfl:   •  fluticasone (FLONASE) 50 MCG/ACT nasal spray, into each nostril As Needed., Disp: , Rfl:   •  gabapentin (NEURONTIN) 300 MG capsule, Take 300 mg by mouth 3 (Three) Times a Day., Disp: , Rfl:   •  glipiZIDE (GLUCOTROL) 5 MG tablet, 1 tablet Daily., Disp: , Rfl:   •  hydrochlorothiazide (HYDRODIURIL) 25 MG tablet, 1 tablet Daily., Disp: , Rfl:   •  HYDROcodone-acetaminophen (NORCO) 5-325 MG per tablet, TAKE 1 TABLET BY MOUTH THREE TIMES A DAY FOR PAIN, Disp: , " Rfl:   •  Insulin Glargine (BASAGLAR KWIKPEN) 100 UNIT/ML injection pen, 10 Units Daily., Disp: , Rfl:   •  Insulin Glargine (Lantus SoloStar) 100 UNIT/ML injection pen, Lantus Solostar U-100 Insulin 100 unit/mL (3 mL) subcutaneous pen  INJECT 50 UNITS SUBCUTANEOUSLY EVERY DAY TO CONTROL BLOOD SUGAR, Disp: , Rfl:   •  JANUVIA 100 MG tablet, Take 100 mg by mouth Daily., Disp: , Rfl:   •  lisinopril (PRINIVIL,ZESTRIL) 10 MG tablet, Take 10 mg by mouth Daily., Disp: , Rfl:   •  meloxicam (MOBIC) 15 MG tablet, , Disp: , Rfl:   •  meloxicam (MOBIC) 7.5 MG tablet, meloxicam 7.5 mg tablet  TAKE 1 TABLET BY MOUTH TWO TIMES A DAY FOR PAIN OR INFLAMMATION, Disp: , Rfl:   •  methocarbamol (ROBAXIN) 500 MG tablet, TAKE 1 TABLET BY MOUTH TWO TIMES A DAY FOR MUSCLE SPASMS, Disp: , Rfl: 5  •  Omega-3 Fatty Acids (FISH OIL) 1000 MG capsule capsule, Take  by mouth 2 (Two) Times a Day., Disp: , Rfl:   •  omeprazole (priLOSEC) 20 MG capsule, Take 20 mg by mouth Daily., Disp: , Rfl:   •  ONE TOUCH ULTRA TEST test strip, , Disp: , Rfl:   •  pioglitazone (ACTOS) 15 MG tablet, Take 15 mg by mouth Daily., Disp: , Rfl:   •  sildenafil (REVATIO) 20 MG tablet, As Needed., Disp: , Rfl: 0  •  simvastatin (ZOCOR) 40 MG tablet, Take 40 mg by mouth Daily., Disp: , Rfl:   •  Ventolin  (90 Base) MCG/ACT inhaler, INHALE 2 PUFFS FOUR TIMES A DAY AS NEEDED FOR BREATHING, Disp: , Rfl:   MEDICATION LIST AND ALLERGIES REVIEWED.    Family History   Problem Relation Age of Onset   • Diabetes Mother    • Hypertension Mother      Social History     Tobacco Use   • Smoking status: Never Smoker   • Smokeless tobacco: Never Used   Substance Use Topics   • Alcohol use: No   • Drug use: No     Social History     Social History Narrative   • Not on file     FAMILY AND SOCIAL HISTORY REVIEWED.    Review of Systems  ALSO REFER TO SCANNED ROS SHEET FROM SAME DATE.    /70 (BP Location: Left arm, Patient Position: Sitting, Cuff Size: Adult)   Pulse 80    "Temp 98.6 °F (37 °C)   Resp 18   Ht 177.8 cm (70\")   Wt 109 kg (241 lb 4 oz)   SpO2 96% Comment: room air at rest  BMI 34.62 kg/m²   Physical Exam  Vitals and nursing note reviewed.   Constitutional:       General: He is not in acute distress.     Appearance: He is well-developed. He is not diaphoretic.   HENT:      Head: Normocephalic and atraumatic.   Neck:      Thyroid: No thyromegaly.   Cardiovascular:      Rate and Rhythm: Normal rate and regular rhythm.      Heart sounds: Normal heart sounds. No murmur heard.     Pulmonary:      Effort: Pulmonary effort is normal.      Breath sounds: Normal breath sounds. No stridor.   Abdominal:      General: Bowel sounds are normal.      Palpations: Abdomen is soft.   Musculoskeletal:         General: Normal range of motion.   Lymphadenopathy:      Cervical: No cervical adenopathy.      Upper Body:      Right upper body: No supraclavicular or epitrochlear adenopathy.      Left upper body: No supraclavicular or epitrochlear adenopathy.   Skin:     General: Skin is warm and dry.   Neurological:      Mental Status: He is alert and oriented to person, place, and time.   Psychiatric:         Behavior: Behavior normal.         Results     Immunization History   Administered Date(s) Administered   • COVID-19 (PFIZER) 03/12/2021, 03/29/2021   • Flulaval/Fluarix/Fluzone Quad 12/02/2020   • Influenza, Unspecified 10/15/2018     Problem List       ICD-10-CM ICD-9-CM   1. Sarcoidosis  D86.9 135   2. Perennial rhinitis  J30.89 477.9    J30.2    3. Obesity, Class II, BMI 35-39.9  E66.9 278.00       Discussion     He appears stable from a pulmonary standpoint.  He is to continue albuterol on an as-needed basis    I have encouraged annual ophthalmology evaluation    Given his sore throat and swallowing/breathing issues I recommended ENT evaluation.  We will try to facilitate that referral.    I will plan to see him back in a year for follow-up    Moderate level of Medical Decision " Making complexity based on 2 or more chronic stable illnesses and prescription drug management.    Himanshu Lee MD  Note electronically signed    CC: Yifan Andrews MD

## 2021-07-08 DIAGNOSIS — R07.0 THROAT PAIN: Primary | ICD-10-CM

## 2025-01-19 ENCOUNTER — APPOINTMENT (OUTPATIENT)
Dept: GENERAL RADIOLOGY | Facility: HOSPITAL | Age: 60
End: 2025-01-19
Payer: COMMERCIAL

## 2025-01-19 ENCOUNTER — HOSPITAL ENCOUNTER (OUTPATIENT)
Facility: HOSPITAL | Age: 60
Discharge: HOME | End: 2025-01-20
Attending: EMERGENCY MEDICINE | Admitting: INTERNAL MEDICINE
Payer: COMMERCIAL

## 2025-01-19 DIAGNOSIS — D86.9 SARCOIDOSIS: ICD-10-CM

## 2025-01-19 DIAGNOSIS — Z86.79 HISTORY OF CORONARY ARTERY DISEASE: ICD-10-CM

## 2025-01-19 DIAGNOSIS — R07.9 CHEST PAIN, UNSPECIFIED TYPE: ICD-10-CM

## 2025-01-19 DIAGNOSIS — I25.110 CORONARY ARTERY DISEASE INVOLVING NATIVE CORONARY ARTERY OF NATIVE HEART WITH UNSTABLE ANGINA PECTORIS: Primary | ICD-10-CM

## 2025-01-19 LAB
ALBUMIN SERPL-MCNC: 4.1 G/DL (ref 3.5–5.2)
ALBUMIN/GLOB SERPL: 1.7 G/DL
ALP SERPL-CCNC: 48 U/L (ref 39–117)
ALT SERPL W P-5'-P-CCNC: 17 U/L (ref 1–41)
ANION GAP SERPL CALCULATED.3IONS-SCNC: 12 MMOL/L (ref 5–15)
AST SERPL-CCNC: 18 U/L (ref 1–40)
BASOPHILS # BLD AUTO: 0.03 10*3/MM3 (ref 0–0.2)
BASOPHILS NFR BLD AUTO: 0.3 % (ref 0–1.5)
BILIRUB SERPL-MCNC: 0.5 MG/DL (ref 0–1.2)
BUN SERPL-MCNC: 7 MG/DL (ref 8–23)
BUN/CREAT SERPL: 8.1 (ref 7–25)
CALCIUM SPEC-SCNC: 9 MG/DL (ref 8.6–10.5)
CHLORIDE SERPL-SCNC: 100 MMOL/L (ref 98–107)
CO2 SERPL-SCNC: 25 MMOL/L (ref 22–29)
CREAT SERPL-MCNC: 0.86 MG/DL (ref 0.76–1.27)
DEPRECATED RDW RBC AUTO: 41.3 FL (ref 37–54)
EGFRCR SERPLBLD CKD-EPI 2021: 98.5 ML/MIN/1.73
EOSINOPHIL # BLD AUTO: 0.08 10*3/MM3 (ref 0–0.4)
EOSINOPHIL NFR BLD AUTO: 0.9 % (ref 0.3–6.2)
ERYTHROCYTE [DISTWIDTH] IN BLOOD BY AUTOMATED COUNT: 13.7 % (ref 12.3–15.4)
GEN 5 1HR TROPONIN T REFLEX: 8 NG/L
GLOBULIN UR ELPH-MCNC: 2.4 GM/DL
GLUCOSE SERPL-MCNC: 222 MG/DL (ref 65–99)
HCT VFR BLD AUTO: 35.8 % (ref 37.5–51)
HGB BLD-MCNC: 12.3 G/DL (ref 13–17.7)
HOLD SPECIMEN: NORMAL
IMM GRANULOCYTES # BLD AUTO: 0.08 10*3/MM3 (ref 0–0.05)
IMM GRANULOCYTES NFR BLD AUTO: 0.9 % (ref 0–0.5)
LIPASE SERPL-CCNC: 17 U/L (ref 13–60)
LYMPHOCYTES # BLD AUTO: 2.94 10*3/MM3 (ref 0.7–3.1)
LYMPHOCYTES NFR BLD AUTO: 31.9 % (ref 19.6–45.3)
MCH RBC QN AUTO: 29.7 PG (ref 26.6–33)
MCHC RBC AUTO-ENTMCNC: 34.4 G/DL (ref 31.5–35.7)
MCV RBC AUTO: 86.5 FL (ref 79–97)
MONOCYTES # BLD AUTO: 0.92 10*3/MM3 (ref 0.1–0.9)
MONOCYTES NFR BLD AUTO: 10 % (ref 5–12)
NEUTROPHILS NFR BLD AUTO: 5.16 10*3/MM3 (ref 1.7–7)
NEUTROPHILS NFR BLD AUTO: 56 % (ref 42.7–76)
NRBC BLD AUTO-RTO: 0 /100 WBC (ref 0–0.2)
NT-PROBNP SERPL-MCNC: 206.9 PG/ML (ref 0–900)
PLATELET # BLD AUTO: 291 10*3/MM3 (ref 140–450)
PMV BLD AUTO: 11 FL (ref 6–12)
POTASSIUM SERPL-SCNC: 3.6 MMOL/L (ref 3.5–5.2)
PROT SERPL-MCNC: 6.5 G/DL (ref 6–8.5)
QT INTERVAL: 426 MS
QT INTERVAL: 438 MS
QTC INTERVAL: 495 MS
QTC INTERVAL: 500 MS
RBC # BLD AUTO: 4.14 10*6/MM3 (ref 4.14–5.8)
SODIUM SERPL-SCNC: 137 MMOL/L (ref 136–145)
TROPONIN T NUMERIC DELTA: 1 NG/L
TROPONIN T SERPL HS-MCNC: 7 NG/L
WBC NRBC COR # BLD AUTO: 9.21 10*3/MM3 (ref 3.4–10.8)
WHOLE BLOOD HOLD COAG: NORMAL
WHOLE BLOOD HOLD SPECIMEN: NORMAL

## 2025-01-19 PROCEDURE — G0378 HOSPITAL OBSERVATION PER HR: HCPCS

## 2025-01-19 PROCEDURE — 85025 COMPLETE CBC W/AUTO DIFF WBC: CPT | Performed by: EMERGENCY MEDICINE

## 2025-01-19 PROCEDURE — 96374 THER/PROPH/DIAG INJ IV PUSH: CPT

## 2025-01-19 PROCEDURE — 25010000002 MORPHINE PER 10 MG: Performed by: INTERNAL MEDICINE

## 2025-01-19 PROCEDURE — 99285 EMERGENCY DEPT VISIT HI MDM: CPT

## 2025-01-19 PROCEDURE — 83880 ASSAY OF NATRIURETIC PEPTIDE: CPT | Performed by: EMERGENCY MEDICINE

## 2025-01-19 PROCEDURE — 71045 X-RAY EXAM CHEST 1 VIEW: CPT

## 2025-01-19 PROCEDURE — 80053 COMPREHEN METABOLIC PANEL: CPT | Performed by: EMERGENCY MEDICINE

## 2025-01-19 PROCEDURE — 83690 ASSAY OF LIPASE: CPT | Performed by: EMERGENCY MEDICINE

## 2025-01-19 PROCEDURE — 84484 ASSAY OF TROPONIN QUANT: CPT | Performed by: EMERGENCY MEDICINE

## 2025-01-19 PROCEDURE — 93005 ELECTROCARDIOGRAM TRACING: CPT | Performed by: EMERGENCY MEDICINE

## 2025-01-19 PROCEDURE — 36415 COLL VENOUS BLD VENIPUNCTURE: CPT

## 2025-01-19 PROCEDURE — 99222 1ST HOSP IP/OBS MODERATE 55: CPT | Performed by: NURSE PRACTITIONER

## 2025-01-19 RX ORDER — CLOPIDOGREL BISULFATE 75 MG/1
75 TABLET ORAL DAILY
COMMUNITY
Start: 2025-01-15 | End: 2026-01-15

## 2025-01-19 RX ORDER — SODIUM CHLORIDE 0.9 % (FLUSH) 0.9 %
10 SYRINGE (ML) INJECTION AS NEEDED
Status: DISCONTINUED | OUTPATIENT
Start: 2025-01-19 | End: 2025-01-20 | Stop reason: HOSPADM

## 2025-01-19 RX ORDER — ATORVASTATIN CALCIUM 40 MG/1
40 TABLET, FILM COATED ORAL DAILY
COMMUNITY
Start: 2025-01-14 | End: 2026-01-14

## 2025-01-19 RX ORDER — ASPIRIN 81 MG/1
324 TABLET, CHEWABLE ORAL ONCE
Status: COMPLETED | OUTPATIENT
Start: 2025-01-19 | End: 2025-01-19

## 2025-01-19 RX ORDER — PANTOPRAZOLE SODIUM 40 MG/1
1 TABLET, DELAYED RELEASE ORAL DAILY
COMMUNITY
Start: 2024-10-17

## 2025-01-19 RX ORDER — NITROGLYCERIN 0.4 MG/1
0.4 TABLET SUBLINGUAL
COMMUNITY
Start: 2024-12-10

## 2025-01-19 RX ORDER — METOPROLOL SUCCINATE 25 MG/1
25 TABLET, EXTENDED RELEASE ORAL DAILY
Status: ON HOLD | COMMUNITY
Start: 2024-12-10 | End: 2025-01-20

## 2025-01-19 RX ORDER — NITROGLYCERIN 0.4 MG/1
0.4 TABLET SUBLINGUAL
Status: DISCONTINUED | OUTPATIENT
Start: 2025-01-19 | End: 2025-01-20

## 2025-01-19 RX ORDER — ISOSORBIDE MONONITRATE 30 MG/1
30 TABLET, EXTENDED RELEASE ORAL DAILY
Status: ON HOLD | COMMUNITY
Start: 2025-01-14 | End: 2025-01-20

## 2025-01-19 RX ORDER — GABAPENTIN 300 MG/1
300 CAPSULE ORAL 3 TIMES DAILY
Status: DISCONTINUED | OUTPATIENT
Start: 2025-01-19 | End: 2025-01-20 | Stop reason: HOSPADM

## 2025-01-19 RX ORDER — MORPHINE SULFATE 2 MG/ML
2 INJECTION, SOLUTION INTRAMUSCULAR; INTRAVENOUS EVERY 4 HOURS PRN
Status: DISCONTINUED | OUTPATIENT
Start: 2025-01-19 | End: 2025-01-20 | Stop reason: HOSPADM

## 2025-01-19 RX ADMIN — NITROGLYCERIN 0.4 MG: 0.4 TABLET SUBLINGUAL at 20:15

## 2025-01-19 RX ADMIN — ASPIRIN 81 MG CHEWABLE TABLET 324 MG: 81 TABLET CHEWABLE at 18:00

## 2025-01-19 RX ADMIN — GABAPENTIN 300 MG: 300 CAPSULE ORAL at 22:46

## 2025-01-19 RX ADMIN — NITROGLYCERIN 0.5 INCH: 20 OINTMENT TOPICAL at 20:38

## 2025-01-19 RX ADMIN — MORPHINE SULFATE 2 MG: 2 INJECTION, SOLUTION INTRAMUSCULAR; INTRAVENOUS at 22:46

## 2025-01-19 RX ADMIN — NITROGLYCERIN 0.4 MG: 0.4 TABLET SUBLINGUAL at 19:57

## 2025-01-19 RX ADMIN — NITROGLYCERIN 0.4 MG: 0.4 TABLET SUBLINGUAL at 17:59

## 2025-01-19 NOTE — ED PROVIDER NOTES
EMERGENCY DEPARTMENT ENCOUNTER    Pt Name: Juan F Beck  MRN: [id number]  Pt :   1965  Room Number:    Date of encounter:  2025  PCP: Yifan Andrews MD  ED Provider: Doyle North MD    Historian: PATIENT AND WIFE      HPI:  Chief Complaint: Chest pain            Context: Juan F Beck is a 59 y.o. male who presents to the ED c/o chest pain which has been persistent for the last 5 days, waxing and waning somewhat.  Last night he took sublingual nitroglycerin and feels this helps somewhat.  He did not take any nitroglycerin today.  The patient has very significant recent cardiac history to include pacemaker defibrillator placement on November 10, 2024 and then coronary artery stenting x 3 just 5 days ago.  Both procedures were performed at [facility].  Patient currently rates his left-sided chest discomfort at 7-8 out of 10 on the pain scale.  He took 1 aspirin 81 mg tablet this morning.        PAST MEDICAL HISTORY  Past Medical History:   Diagnosis Date    History of bronchoscopy 2012    CATRACHITA,lingula,LLL and superior segment of LLL revealed no endobronchial lesions.RUL,bronchus intermedius,RLL,superior segment of RLL again revealed no endobronchial lesions    History of chest x-ray 2014    There is some straightening of the left cardiac border but no cardiomegaly.There are degenerative changes of the thoracic spine with anterior lipping.There are a few calcified nodules bilaterally consistent with old granulomatous disease.Anterior paratracheal fullness cannot be excluded but no effusions, infiltrates or adenopathy    History of chest x-ray 2012    CT ratio is 15/35.The cardiac silhouette appears within normal limits of size.No effusions, infiltrates or consolidations    History of chest x-ray 2012    Enlargement of the right paratracheal soft tissues which can be seen with prominent mediastinal fat or mediastinal lymphadenopathy. No acute airspace  consolidation    History of PFTs 12/21/2015    No obstruction. No restriction. Normal diffusion    History of PFTs 12/04/2014    No obstruction. No restriction. Normal diffusion    History of PFTs 11/18/2013    No obstruction. No restriction. Normal diffusion         PAST SURGICAL HISTORY  Past Surgical History:   Procedure Laterality Date    APPENDECTOMY      CARDIAC CATHETERIZATION      A.  Negative cardiac catheterization with ejection fraction of 55% in 2007.    CHOLECYSTECTOMY           FAMILY HISTORY  Family History   Problem Relation Age of Onset    Diabetes Mother     Hypertension Mother          SOCIAL HISTORY  Social History     Socioeconomic History    Marital status:    Tobacco Use    Smoking status: Never    Smokeless tobacco: Never   Substance and Sexual Activity    Alcohol use: No    Drug use: No    Sexual activity: Defer         ALLERGIES  Celecoxib        REVIEW OF SYSTEMS  Review of Systems       All systems reviewed and negative except for those discussed in HPI.       PHYSICAL EXAM    I have reviewed the triage vital signs and nursing notes.    ED Triage Vitals [01/19/25 1725]   Temp Heart Rate Resp BP SpO2   98.2 °F (36.8 °C) 98 16 164/93 98 %      Temp src Heart Rate Source Patient Position BP Location FiO2 (%)   Oral Monitor Sitting -- --       Physical Exam  GENERAL:   Appears in no acute distress.  He presents with his wife at side.  He is in room 14.  HENT: Nares patent.  EYES: No scleral icterus.  CV: Regular rhythm, regular rate.  No murmurs gallops rubs  RESPIRATORY: Normal effort.  No audible wheezes, rales or rhonchi.  Clear to auscultation  ABDOMEN: Soft, nontender to deep palpation even in the epigastric region   MUSCULOSKELETAL: No deformities. no reproduction of chest discomfort with chest wall palpation  NEURO: Alert, moves all extremities, follows commands.  SKIN: Warm, dry, no rash visualized.      LAB RESULTS  Recent Results (from the past 24 hours)   ECG 12 Lead ED  Triage Standing Order; Chest Pain    Collection Time: 01/19/25  5:30 PM   Result Value Ref Range    QT Interval 426 ms    QTC Interval 500 ms   High Sensitivity Troponin T    Collection Time: 01/19/25  5:56 PM    Specimen: Blood   Result Value Ref Range    HS Troponin T 7 <22 ng/L   Comprehensive Metabolic Panel    Collection Time: 01/19/25  5:56 PM    Specimen: Blood   Result Value Ref Range    Glucose 222 (H) 65 - 99 mg/dL    BUN 7 (L) 8 - 23 mg/dL    Creatinine 0.86 0.76 - 1.27 mg/dL    Sodium 137 136 - 145 mmol/L    Potassium 3.6 3.5 - 5.2 mmol/L    Chloride 100 98 - 107 mmol/L    CO2 25.0 22.0 - 29.0 mmol/L    Calcium 9.0 8.6 - 10.5 mg/dL    Total Protein 6.5 6.0 - 8.5 g/dL    Albumin 4.1 3.5 - 5.2 g/dL    ALT (SGPT) 17 1 - 41 U/L    AST (SGOT) 18 1 - 40 U/L    Alkaline Phosphatase 48 39 - 117 U/L    Total Bilirubin 0.5 0.0 - 1.2 mg/dL    Globulin 2.4 gm/dL    A/G Ratio 1.7 g/dL    BUN/Creatinine Ratio 8.1 7.0 - 25.0    Anion Gap 12.0 5.0 - 15.0 mmol/L    eGFR 98.5 >60.0 mL/min/1.73   Lipase    Collection Time: 01/19/25  5:56 PM    Specimen: Blood   Result Value Ref Range    Lipase 17 13 - 60 U/L   BNP    Collection Time: 01/19/25  5:56 PM    Specimen: Blood   Result Value Ref Range    proBNP 206.9 0.0 - 900.0 pg/mL   Green Top (Gel)    Collection Time: 01/19/25  5:56 PM   Result Value Ref Range    Extra Tube Hold for add-ons.    Lavender Top    Collection Time: 01/19/25  5:56 PM   Result Value Ref Range    Extra Tube hold for add-on    Gold Top - SST    Collection Time: 01/19/25  5:56 PM   Result Value Ref Range    Extra Tube Hold for add-ons.    Gray Top    Collection Time: 01/19/25  5:56 PM   Result Value Ref Range    Extra Tube Hold for add-ons.    Light Blue Top    Collection Time: 01/19/25  5:56 PM   Result Value Ref Range    Extra Tube Hold for add-ons.    CBC Auto Differential    Collection Time: 01/19/25  5:56 PM    Specimen: Blood   Result Value Ref Range    WBC 9.21 3.40 - 10.80 10*3/mm3    RBC 4.14  4.14 - 5.80 10*6/mm3    Hemoglobin 12.3 (L) 13.0 - 17.7 g/dL    Hematocrit 35.8 (L) 37.5 - 51.0 %    MCV 86.5 79.0 - 97.0 fL    MCH 29.7 26.6 - 33.0 pg    MCHC 34.4 31.5 - 35.7 g/dL    RDW 13.7 12.3 - 15.4 %    RDW-SD 41.3 37.0 - 54.0 fl    MPV 11.0 6.0 - 12.0 fL    Platelets 291 140 - 450 10*3/mm3    Neutrophil % 56.0 42.7 - 76.0 %    Lymphocyte % 31.9 19.6 - 45.3 %    Monocyte % 10.0 5.0 - 12.0 %    Eosinophil % 0.9 0.3 - 6.2 %    Basophil % 0.3 0.0 - 1.5 %    Immature Grans % 0.9 (H) 0.0 - 0.5 %    Neutrophils, Absolute 5.16 1.70 - 7.00 10*3/mm3    Lymphocytes, Absolute 2.94 0.70 - 3.10 10*3/mm3    Monocytes, Absolute 0.92 (H) 0.10 - 0.90 10*3/mm3    Eosinophils, Absolute 0.08 0.00 - 0.40 10*3/mm3    Basophils, Absolute 0.03 0.00 - 0.20 10*3/mm3    Immature Grans, Absolute 0.08 (H) 0.00 - 0.05 10*3/mm3    nRBC 0.0 0.0 - 0.2 /100 WBC   High Sensitivity Troponin T 1Hr    Collection Time: 01/19/25  7:21 PM    Specimen: Blood   Result Value Ref Range    HS Troponin T 8 <22 ng/L    Troponin T Numeric Delta 1 Abnormal if >/=3 ng/L   ECG 12 Lead ED Triage Standing Order; Chest Pain    Collection Time: 01/19/25  7:22 PM   Result Value Ref Range    QT Interval 438 ms    QTC Interval 495 ms       If labs were ordered, I independently reviewed the results and considered them in treating the patient.        RADIOLOGY  XR Chest 1 View    Result Date: 1/19/2025  XR CHEST 1 VW Date of Exam: 1/19/2025 5:59 PM EST Indication: Chest Pain Triage Protocol Comparison: December 2, 2020 Findings: A left subclavian pacemaker is in place. The lungs are clear. The heart and mediastinal contours appear normal. There is no pleural effusion. The pulmonary vasculature appears normal. The osseous structures appear intact.     Impression: No acute cardiopulmonary process. Electronically Signed: Yifan Hein MD  1/19/2025 6:30 PM EST  Workstation ID: [id number]     I ordered and independently reviewed the above noted radiographic studies.       I viewed images of chest x-ray which showed no active disease per my independent interpretation.    See radiologist's dictation for official interpretation.        PROCEDURES    Procedures    ECG 12 Lead ED Triage Standing Order; Chest Pain   Final Result   Test Reason : ED Triage Standing Order~   Blood Pressure :   */*   mmHG   Vent. Rate :  77 BPM     Atrial Rate :  77 BPM      P-R Int : 150 ms          QRS Dur : 190 ms       QT Int : 438 ms       P-R-T Axes :  44 -73  77 degrees     QTcB Int : 495 ms      Atrial-sensed ventricular-paced rhythm   Abnormal ECG   When compared with ECG of 19-Jan-2025 17:30, (Unconfirmed)   Vent. rate has decreased by   6 bpm   Confirmed by MIMI WIGGINS MD ([id number]) on 1/19/2025 7:55:17 PM      Referred By: DEANDRA           Confirmed By: MIMI WIGGINS MD      ECG 12 Lead ED Triage Standing Order; Chest Pain   Final Result   Test Reason : ED Triage Standing Order~   Blood Pressure :   */*   mmHG   Vent. Rate :  83 BPM     Atrial Rate :  83 BPM      P-R Int : 154 ms          QRS Dur : 188 ms       QT Int : 426 ms       P-R-T Axes :  58 -74  85 degrees     QTcB Int : 500 ms      Atrial-sensed ventricular-paced rhythm   Abnormal ECG   When compared with ECG of 21-Mar-2012 11:14,   Electronic ventricular pacemaker has replaced Sinus rhythm   Confirmed by MIMI WIGGINS MD ([id number]) on 1/19/2025 7:57:10 PM      Referred By: EDMD           Confirmed By: MIMI WIGGINS MD          MEDICATIONS GIVEN IN ER    Medications   sodium chloride 0.9 % flush 10 mL (has no administration in time range)   nitroglycerin (NITROSTAT) SL tablet 0.4 mg (0.4 mg Sublingual Given 1/19/25 1957)   nitroglycerin (NITROSTAT) ointment 0.5 inch (has no administration in time range)   aspirin chewable tablet 324 mg (324 mg Oral Given 1/19/25 1800)         MEDICAL DECISION MAKING, PROGRESS, and CONSULTS    All labs, if obtained, have been independently reviewed by me.  All radiology studies, if obtained, have been reviewed  by me and the radiologist dictating the report.  All EKG's, if obtained, have been independently viewed and interpreted by me/my attending physician.      Discussion below represents my analysis of pertinent findings related to patient's condition, differential diagnosis, treatment plan and final disposition.              HEART Score: 4   Shared Decision Making  I discussed the findings with the patient/patient representative who is in agreement with the treatment plan and the final disposition.  Risks and benefits of discharge and/or observation/admission were discussed: Yes                             Differential diagnosis:    Chest discomfort which is most concerning for coronary ischemia.  Chest wall strain, reflux, anxiety, etc. considered as well.      Additional sources:    - Discussed/ obtained information from independent historians: I spoke with patient's wife at bedside in room 14.    - External (non-ED) record review: I reviewed multiple records to include [facility] records from recent admission and heart catheterization:    1/14/2025    Coronary angiography  Dominance: Right  LMCA: Minimal plaque otherwise angiographically normal   LAD: Has mild plaque mostly in its mid segment otherwise angiographically normal   LCx: Has mild luminal irregularities otherwise angiographically normal  RCA: Has 85-90% stenosis in its proximal segment, 70% stenosis in its mid segment, and 90% stenosis by IVUS in its distal segment.  Assessment / Plan      1.  Unstable angina  2.  Abnormal nuclear stress test  3.  Cardiac sarcoidosis  4.  History of complete heart block secondary to cardiac sarcoidosis  5.  Inducible ventricular fibrillation  6.  Status post Abbott implantable cardiac defibrillator for secondary prevention of sudden cardiac death  7.  Hypertension  8.  Generalized anxiety disorder     Recommendations:  59-year-old gentleman with previous history of cardiac sarcoidosis and history of complete heart  block and inducible ventricular fibrillation status post AICD placement who now presented with chest pain.  His stress test demonstrated inferior ischemia.  He underwent cardiac catheterization and received PCI to RCA with 3 stents yesterday.       - Chronic or social conditions impacting care: History of sarcoidosis.  Non-smoker nondrinker.        Orders placed during this visit:  Orders Placed This Encounter   Procedures    XR Chest 1 View    [place] Draw    High Sensitivity Troponin T    Comprehensive Metabolic Panel    Lipase    BNP    CBC Auto Differential    High Sensitivity Troponin T 1Hr    NPO Diet NPO Type: Strict NPO    Undress & Gown    Continuous Pulse Oximetry    Oxygen Therapy- Nasal Cannula; Titrate 1-6 LPM Per SpO2; 90 - 95%    ECG 12 Lead ED Triage Standing Order; Chest Pain    ECG 12 Lead ED Triage Standing Order; Chest Pain    Insert Peripheral IV    CBC & Differential    Green Top (Gel)    Lavender Top    Gold Top - SST    Gray Top    Light Blue Top         Additional orders considered but not ordered:  CTA chest    ED Course:    Consultants: Hospitalist                Shared Decision Making:  After my consideration of clinical presentation and any laboratory/radiology studies obtained, I discussed the findings with the patient/patient representative who is in agreement with the treatment plan and the final disposition.   Risks and benefits of discharge and/or observation/admission were discussed.       AS OF 20:05 EST VITALS:    BP - 134/77  HR - 72  TEMP - 98.2 °F (36.8 °C) (Oral)  O2 SATS - 99%                  DIAGNOSIS  Final diagnoses:   Chest pain, unspecified type   History of coronary artery disease         DISPOSITION  Admission      Please note that portions of this document were completed with voice recognition software.        Doyle North MD  01/19/25 0001

## 2025-01-19 NOTE — Clinical Note
Level of Care: Telemetry [5]   Diagnosis: Chest pain [356913]   Admitting Physician: KELSEY DRAPER III [[id number]]   Attending Physician: KELSEY DRAPER III [[id number]]   Bed Request Comments: tele obs cdu

## 2025-01-20 VITALS
DIASTOLIC BLOOD PRESSURE: 72 MMHG | SYSTOLIC BLOOD PRESSURE: 128 MMHG | OXYGEN SATURATION: 98 % | HEIGHT: 71 IN | HEART RATE: 78 BPM | WEIGHT: 200 LBS | BODY MASS INDEX: 28 KG/M2 | TEMPERATURE: 97.9 F | RESPIRATION RATE: 16 BRPM

## 2025-01-20 PROBLEM — I25.110 CORONARY ARTERY DISEASE INVOLVING NATIVE CORONARY ARTERY OF NATIVE HEART WITH UNSTABLE ANGINA PECTORIS: Status: ACTIVE | Noted: 2025-01-19

## 2025-01-20 PROBLEM — I48.0 PAROXYSMAL ATRIAL FIBRILLATION: Status: ACTIVE | Noted: 2025-01-20

## 2025-01-20 LAB
ACT BLD: 222 SECONDS (ref 82–152)
ALBUMIN SERPL-MCNC: 4 G/DL (ref 3.5–5.2)
ALBUMIN/GLOB SERPL: 1.8 G/DL
ALP SERPL-CCNC: 42 U/L (ref 39–117)
ALT SERPL W P-5'-P-CCNC: 14 U/L (ref 1–41)
ANION GAP SERPL CALCULATED.3IONS-SCNC: 9 MMOL/L (ref 5–15)
AST SERPL-CCNC: 13 U/L (ref 1–40)
BASOPHILS # BLD AUTO: 0.03 10*3/MM3 (ref 0–0.2)
BASOPHILS NFR BLD AUTO: 0.5 % (ref 0–1.5)
BILIRUB SERPL-MCNC: 0.7 MG/DL (ref 0–1.2)
BUN SERPL-MCNC: 6 MG/DL (ref 8–23)
BUN/CREAT SERPL: 7.1 (ref 7–25)
CALCIUM SPEC-SCNC: 9.7 MG/DL (ref 8.6–10.5)
CHLORIDE SERPL-SCNC: 103 MMOL/L (ref 98–107)
CO2 SERPL-SCNC: 27 MMOL/L (ref 22–29)
CREAT SERPL-MCNC: 0.84 MG/DL (ref 0.76–1.27)
DEPRECATED RDW RBC AUTO: 43 FL (ref 37–54)
EGFRCR SERPLBLD CKD-EPI 2021: 99.2 ML/MIN/1.73
EOSINOPHIL # BLD AUTO: 0.08 10*3/MM3 (ref 0–0.4)
EOSINOPHIL NFR BLD AUTO: 1.2 % (ref 0.3–6.2)
ERYTHROCYTE [DISTWIDTH] IN BLOOD BY AUTOMATED COUNT: 14.3 % (ref 12.3–15.4)
GLOBULIN UR ELPH-MCNC: 2.2 GM/DL
GLUCOSE BLDC GLUCOMTR-MCNC: 130 MG/DL (ref 70–130)
GLUCOSE SERPL-MCNC: 159 MG/DL (ref 65–99)
HCT VFR BLD AUTO: 36.2 % (ref 37.5–51)
HGB BLD-MCNC: 12.1 G/DL (ref 13–17.7)
IMM GRANULOCYTES # BLD AUTO: 0.06 10*3/MM3 (ref 0–0.05)
IMM GRANULOCYTES NFR BLD AUTO: 0.9 % (ref 0–0.5)
LYMPHOCYTES # BLD AUTO: 1.63 10*3/MM3 (ref 0.7–3.1)
LYMPHOCYTES NFR BLD AUTO: 24.9 % (ref 19.6–45.3)
MAGNESIUM SERPL-MCNC: 1.7 MG/DL (ref 1.6–2.4)
MCH RBC QN AUTO: 29.4 PG (ref 26.6–33)
MCHC RBC AUTO-ENTMCNC: 33.4 G/DL (ref 31.5–35.7)
MCV RBC AUTO: 87.9 FL (ref 79–97)
MONOCYTES # BLD AUTO: 0.61 10*3/MM3 (ref 0.1–0.9)
MONOCYTES NFR BLD AUTO: 9.3 % (ref 5–12)
NEUTROPHILS NFR BLD AUTO: 4.13 10*3/MM3 (ref 1.7–7)
NEUTROPHILS NFR BLD AUTO: 63.2 % (ref 42.7–76)
NRBC BLD AUTO-RTO: 0 /100 WBC (ref 0–0.2)
PLATELET # BLD AUTO: 250 10*3/MM3 (ref 140–450)
PMV BLD AUTO: 11.1 FL (ref 6–12)
POTASSIUM SERPL-SCNC: 4.6 MMOL/L (ref 3.5–5.2)
PROT SERPL-MCNC: 6.2 G/DL (ref 6–8.5)
QT INTERVAL: 430 MS
QTC INTERVAL: 499 MS
RBC # BLD AUTO: 4.12 10*6/MM3 (ref 4.14–5.8)
SODIUM SERPL-SCNC: 139 MMOL/L (ref 136–145)
TROPONIN T SERPL HS-MCNC: <6 NG/L
WBC NRBC COR # BLD AUTO: 6.54 10*3/MM3 (ref 3.4–10.8)

## 2025-01-20 PROCEDURE — 93571 IV DOP VEL&/PRESS C FLO 1ST: CPT | Performed by: INTERNAL MEDICINE

## 2025-01-20 PROCEDURE — 99204 OFFICE O/P NEW MOD 45 MIN: CPT

## 2025-01-20 PROCEDURE — 99238 HOSP IP/OBS DSCHRG MGMT 30/<: CPT | Performed by: INTERNAL MEDICINE

## 2025-01-20 PROCEDURE — 80053 COMPREHEN METABOLIC PANEL: CPT | Performed by: NURSE PRACTITIONER

## 2025-01-20 PROCEDURE — 25010000002 MIDAZOLAM PER 1 MG: Performed by: INTERNAL MEDICINE

## 2025-01-20 PROCEDURE — 85025 COMPLETE CBC W/AUTO DIFF WBC: CPT | Performed by: NURSE PRACTITIONER

## 2025-01-20 PROCEDURE — 99243 OFF/OP CNSLTJ NEW/EST LOW 30: CPT | Performed by: INTERNAL MEDICINE

## 2025-01-20 PROCEDURE — 84484 ASSAY OF TROPONIN QUANT: CPT | Performed by: NURSE PRACTITIONER

## 2025-01-20 PROCEDURE — C1894 INTRO/SHEATH, NON-LASER: HCPCS | Performed by: INTERNAL MEDICINE

## 2025-01-20 PROCEDURE — C1769 GUIDE WIRE: HCPCS | Performed by: INTERNAL MEDICINE

## 2025-01-20 PROCEDURE — 82948 REAGENT STRIP/BLOOD GLUCOSE: CPT

## 2025-01-20 PROCEDURE — 25010000002 HEPARIN (PORCINE) PER 1000 UNITS: Performed by: INTERNAL MEDICINE

## 2025-01-20 PROCEDURE — 25010000002 LIDOCAINE 1 % SOLUTION: Performed by: INTERNAL MEDICINE

## 2025-01-20 PROCEDURE — 25010000002 MORPHINE PER 10 MG: Performed by: INTERNAL MEDICINE

## 2025-01-20 PROCEDURE — G0378 HOSPITAL OBSERVATION PER HR: HCPCS

## 2025-01-20 PROCEDURE — 83735 ASSAY OF MAGNESIUM: CPT | Performed by: NURSE PRACTITIONER

## 2025-01-20 PROCEDURE — 93458 L HRT ARTERY/VENTRICLE ANGIO: CPT | Performed by: INTERNAL MEDICINE

## 2025-01-20 PROCEDURE — 85347 COAGULATION TIME ACTIVATED: CPT

## 2025-01-20 PROCEDURE — 93010 ELECTROCARDIOGRAM REPORT: CPT | Performed by: INTERNAL MEDICINE

## 2025-01-20 PROCEDURE — 25510000001 IOPAMIDOL PER 1 ML: Performed by: INTERNAL MEDICINE

## 2025-01-20 PROCEDURE — 25010000002 NICARDIPINE 2.5 MG/ML SOLUTION: Performed by: INTERNAL MEDICINE

## 2025-01-20 PROCEDURE — 93799 UNLISTED CV SVC/PROCEDURE: CPT | Performed by: INTERNAL MEDICINE

## 2025-01-20 PROCEDURE — 93572 IV DOP VEL&/PRESS C FLO EA: CPT | Performed by: INTERNAL MEDICINE

## 2025-01-20 PROCEDURE — 96376 TX/PRO/DX INJ SAME DRUG ADON: CPT

## 2025-01-20 PROCEDURE — 25810000003 SODIUM CHLORIDE 0.9 % SOLUTION: Performed by: INTERNAL MEDICINE

## 2025-01-20 PROCEDURE — C1887 CATHETER, GUIDING: HCPCS | Performed by: INTERNAL MEDICINE

## 2025-01-20 PROCEDURE — 93005 ELECTROCARDIOGRAM TRACING: CPT | Performed by: NURSE PRACTITIONER

## 2025-01-20 RX ORDER — INSULIN LISPRO 100 [IU]/ML
2-9 INJECTION, SOLUTION INTRAVENOUS; SUBCUTANEOUS
Status: DISCONTINUED | OUTPATIENT
Start: 2025-01-20 | End: 2025-01-20 | Stop reason: HOSPADM

## 2025-01-20 RX ORDER — CLOPIDOGREL BISULFATE 75 MG/1
75 TABLET ORAL DAILY
Status: DISCONTINUED | OUTPATIENT
Start: 2025-01-20 | End: 2025-01-20 | Stop reason: HOSPADM

## 2025-01-20 RX ORDER — POLYETHYLENE GLYCOL 3350 17 G/17G
17 POWDER, FOR SOLUTION ORAL DAILY PRN
Status: DISCONTINUED | OUTPATIENT
Start: 2025-01-20 | End: 2025-01-20 | Stop reason: HOSPADM

## 2025-01-20 RX ORDER — ACETAMINOPHEN 500 MG
500 TABLET ORAL EVERY 6 HOURS PRN
COMMUNITY

## 2025-01-20 RX ORDER — IBUPROFEN 600 MG/1
1 TABLET ORAL
Status: DISCONTINUED | OUTPATIENT
Start: 2025-01-20 | End: 2025-01-20 | Stop reason: HOSPADM

## 2025-01-20 RX ORDER — SODIUM CHLORIDE 0.9 % (FLUSH) 0.9 %
10 SYRINGE (ML) INJECTION EVERY 12 HOURS SCHEDULED
Status: DISCONTINUED | OUTPATIENT
Start: 2025-01-20 | End: 2025-01-20 | Stop reason: HOSPADM

## 2025-01-20 RX ORDER — BISACODYL 5 MG/1
5 TABLET, DELAYED RELEASE ORAL DAILY PRN
Status: DISCONTINUED | OUTPATIENT
Start: 2025-01-20 | End: 2025-01-20 | Stop reason: HOSPADM

## 2025-01-20 RX ORDER — ISOSORBIDE MONONITRATE 60 MG/1
60 TABLET, EXTENDED RELEASE ORAL DAILY
Status: DISCONTINUED | OUTPATIENT
Start: 2025-01-21 | End: 2025-01-20 | Stop reason: HOSPADM

## 2025-01-20 RX ORDER — HEPARIN SODIUM 1000 [USP'U]/ML
INJECTION, SOLUTION INTRAVENOUS; SUBCUTANEOUS
Status: DISCONTINUED | OUTPATIENT
Start: 2025-01-20 | End: 2025-01-20 | Stop reason: HOSPADM

## 2025-01-20 RX ORDER — METOPROLOL SUCCINATE 25 MG/1
25 TABLET, EXTENDED RELEASE ORAL DAILY
Status: DISCONTINUED | OUTPATIENT
Start: 2025-01-20 | End: 2025-01-20

## 2025-01-20 RX ORDER — HYDROCODONE BITARTRATE AND ACETAMINOPHEN 7.5; 325 MG/1; MG/1
1 TABLET ORAL EVERY 6 HOURS PRN
Status: DISCONTINUED | OUTPATIENT
Start: 2025-01-20 | End: 2025-01-20 | Stop reason: HOSPADM

## 2025-01-20 RX ORDER — ACETAMINOPHEN 325 MG/1
650 TABLET ORAL EVERY 4 HOURS PRN
Status: DISCONTINUED | OUTPATIENT
Start: 2025-01-20 | End: 2025-01-20 | Stop reason: HOSPADM

## 2025-01-20 RX ORDER — NITROGLYCERIN 0.4 MG/1
0.4 TABLET SUBLINGUAL
Status: DISCONTINUED | OUTPATIENT
Start: 2025-01-20 | End: 2025-01-20 | Stop reason: HOSPADM

## 2025-01-20 RX ORDER — ATORVASTATIN CALCIUM 40 MG/1
40 TABLET, FILM COATED ORAL DAILY
Status: DISCONTINUED | OUTPATIENT
Start: 2025-01-20 | End: 2025-01-20 | Stop reason: HOSPADM

## 2025-01-20 RX ORDER — MIDAZOLAM HYDROCHLORIDE 1 MG/ML
INJECTION, SOLUTION INTRAMUSCULAR; INTRAVENOUS
Status: DISCONTINUED | OUTPATIENT
Start: 2025-01-20 | End: 2025-01-20 | Stop reason: HOSPADM

## 2025-01-20 RX ORDER — SODIUM CHLORIDE 9 MG/ML
100 INJECTION, SOLUTION INTRAVENOUS CONTINUOUS
Status: DISCONTINUED | OUTPATIENT
Start: 2025-01-20 | End: 2025-01-20 | Stop reason: HOSPADM

## 2025-01-20 RX ORDER — IOPAMIDOL 755 MG/ML
INJECTION, SOLUTION INTRAVASCULAR
Status: DISCONTINUED | OUTPATIENT
Start: 2025-01-20 | End: 2025-01-20 | Stop reason: HOSPADM

## 2025-01-20 RX ORDER — ASPIRIN 81 MG/1
81 TABLET ORAL DAILY
Status: DISCONTINUED | OUTPATIENT
Start: 2025-01-20 | End: 2025-01-20 | Stop reason: HOSPADM

## 2025-01-20 RX ORDER — PANTOPRAZOLE SODIUM 40 MG/1
40 TABLET, DELAYED RELEASE ORAL DAILY
Status: DISCONTINUED | OUTPATIENT
Start: 2025-01-20 | End: 2025-01-20 | Stop reason: HOSPADM

## 2025-01-20 RX ORDER — HYDROCODONE BITARTRATE AND ACETAMINOPHEN 5; 325 MG/1; MG/1
1 TABLET ORAL EVERY 8 HOURS PRN
Status: DISCONTINUED | OUTPATIENT
Start: 2025-01-20 | End: 2025-01-20 | Stop reason: HOSPADM

## 2025-01-20 RX ORDER — SODIUM CHLORIDE 9 MG/ML
40 INJECTION, SOLUTION INTRAVENOUS AS NEEDED
Status: DISCONTINUED | OUTPATIENT
Start: 2025-01-20 | End: 2025-01-20 | Stop reason: HOSPADM

## 2025-01-20 RX ORDER — NICOTINE POLACRILEX 4 MG
15 LOZENGE BUCCAL
Status: DISCONTINUED | OUTPATIENT
Start: 2025-01-20 | End: 2025-01-20 | Stop reason: HOSPADM

## 2025-01-20 RX ORDER — METOPROLOL TARTRATE 1 MG/ML
INJECTION, SOLUTION INTRAVENOUS
Status: DISCONTINUED | OUTPATIENT
Start: 2025-01-20 | End: 2025-01-20 | Stop reason: HOSPADM

## 2025-01-20 RX ORDER — SODIUM CHLORIDE 0.9 % (FLUSH) 0.9 %
10 SYRINGE (ML) INJECTION AS NEEDED
Status: DISCONTINUED | OUTPATIENT
Start: 2025-01-20 | End: 2025-01-20 | Stop reason: HOSPADM

## 2025-01-20 RX ORDER — METOPROLOL SUCCINATE 50 MG/1
50 TABLET, EXTENDED RELEASE ORAL DAILY
Status: DISCONTINUED | OUTPATIENT
Start: 2025-01-21 | End: 2025-01-20 | Stop reason: HOSPADM

## 2025-01-20 RX ORDER — METOPROLOL SUCCINATE 25 MG/1
50 TABLET, EXTENDED RELEASE ORAL DAILY
Qty: 90 TABLET | Refills: 1 | Status: SHIPPED | OUTPATIENT
Start: 2025-01-20

## 2025-01-20 RX ORDER — LIDOCAINE HYDROCHLORIDE 10 MG/ML
INJECTION, SOLUTION INFILTRATION; PERINEURAL
Status: DISCONTINUED | OUTPATIENT
Start: 2025-01-20 | End: 2025-01-20 | Stop reason: HOSPADM

## 2025-01-20 RX ORDER — ISOSORBIDE MONONITRATE 30 MG/1
30 TABLET, EXTENDED RELEASE ORAL DAILY
Status: DISCONTINUED | OUTPATIENT
Start: 2025-01-20 | End: 2025-01-20

## 2025-01-20 RX ORDER — DEXTROSE MONOHYDRATE 25 G/50ML
25 INJECTION, SOLUTION INTRAVENOUS
Status: DISCONTINUED | OUTPATIENT
Start: 2025-01-20 | End: 2025-01-20 | Stop reason: HOSPADM

## 2025-01-20 RX ORDER — BISACODYL 10 MG
10 SUPPOSITORY, RECTAL RECTAL DAILY PRN
Status: DISCONTINUED | OUTPATIENT
Start: 2025-01-20 | End: 2025-01-20 | Stop reason: HOSPADM

## 2025-01-20 RX ORDER — ISOSORBIDE MONONITRATE 60 MG/1
60 TABLET, EXTENDED RELEASE ORAL DAILY
Qty: 90 TABLET | Refills: 1 | Status: SHIPPED | OUTPATIENT
Start: 2025-01-20 | End: 2026-01-20

## 2025-01-20 RX ORDER — AMOXICILLIN 250 MG
2 CAPSULE ORAL 2 TIMES DAILY PRN
Status: DISCONTINUED | OUTPATIENT
Start: 2025-01-20 | End: 2025-01-20 | Stop reason: HOSPADM

## 2025-01-20 RX ADMIN — ISOSORBIDE MONONITRATE 30 MG: 30 TABLET, EXTENDED RELEASE ORAL at 08:25

## 2025-01-20 RX ADMIN — ASPIRIN 81 MG: 81 TABLET, COATED ORAL at 08:25

## 2025-01-20 RX ADMIN — HYDROCODONE BITARTRATE AND ACETAMINOPHEN 1 TABLET: 5; 325 TABLET ORAL at 13:01

## 2025-01-20 RX ADMIN — ATORVASTATIN CALCIUM 40 MG: 40 TABLET, FILM COATED ORAL at 08:25

## 2025-01-20 RX ADMIN — GABAPENTIN 300 MG: 300 CAPSULE ORAL at 08:25

## 2025-01-20 RX ADMIN — MORPHINE SULFATE 2 MG: 2 INJECTION, SOLUTION INTRAMUSCULAR; INTRAVENOUS at 08:24

## 2025-01-20 RX ADMIN — SODIUM CHLORIDE 100 ML/HR: 9 INJECTION, SOLUTION INTRAVENOUS at 13:30

## 2025-01-20 RX ADMIN — MORPHINE SULFATE 2 MG: 2 INJECTION, SOLUTION INTRAMUSCULAR; INTRAVENOUS at 02:48

## 2025-01-20 RX ADMIN — CLOPIDOGREL BISULFATE 75 MG: 75 TABLET ORAL at 08:25

## 2025-01-20 RX ADMIN — Medication 10 ML: at 08:26

## 2025-01-20 RX ADMIN — PANTOPRAZOLE SODIUM 40 MG: 40 TABLET, DELAYED RELEASE ORAL at 08:25

## 2025-01-20 RX ADMIN — METOPROLOL SUCCINATE 25 MG: 25 TABLET, EXTENDED RELEASE ORAL at 08:25

## 2025-01-20 RX ADMIN — GABAPENTIN 300 MG: 300 CAPSULE ORAL at 16:10

## 2025-01-20 NOTE — CONSULTS
Discussed and taught patient about type 2 diabetes self-management, risk factors, and importance of blood glucose control to reduce complications. Target blood glucose readings and A1c goals per ADA were reviewed. No A1c to review. Signs, symptoms, and treatment of hyperglycemia and hypoglycemia were discussed. Lifestyle changes such as physical activity with MD approval and healthy eating were encouraged. Stressed the importance of strict blood sugar control after surgery to prevent complications such as infection and to promote healing of incision. Encouraged pt to monitor blood sugar at home 3+  times per day and to call PCP if blood sugar is trending high. Patient reports using a Dexcom to monitor. Not currently on at this time. Encouraged to keep record of blood glucose readings to take to follow up appointment with PCP. 30 minutes in the care and education of this patient.

## 2025-01-20 NOTE — CONSULTS
[facility]  CARDIOLOGY CONSULTATION NOTE    Juan F Beck  : 1965  MRN:[id number]  Home Phone:[phone]    Date of Admission:2025  Date of Consultation: 25    PCP: Yifan Andrews MD    IDENTIFICATION: A 59 y.o. male resident of [place], [place]     Chief Complaint   Patient presents with    Chest Pain     PROBLEM LIST:   CAD  Stress MPS 25: moderate sized  apical inferior ischemia, moderate sized fixed defect in inferior wall consistent with diaphragmatic attenuation, normal LVEF  LHC at [facility] 25: significant RCA disease from proximal to distal segment, s/p placement of MARY JANE x3   Complete heart block and syncope 2024  Thought related to cardiac sarcoidosis  Echo 2024: Normal LVEF, mild asymmetric LVH, no significant valvular disease  Inducible VT/VF  S/p dual chamber [employer] ICD placement at [facility], followed by Dr. Wise  Sarcoidosis  Hypertension  Hyperlipidemia  DM2    ALLERGIES:   Allergies   Allergen Reactions    Celecoxib Swelling     HOME MEDICINES:   Current Outpatient Medications   Medication Instructions    allopurinol (ZYLOPRIM) 300 MG tablet 1 tablet, Daily    ASPIR-LOW 81 MG EC tablet 1 tablet, Daily    atorvastatin (LIPITOR) 40 mg, Daily    BASAGLAR KWIKPEN 10 Units, Daily    clopidogrel (PLAVIX) 75 mg, Daily    diclofenac (VOLTAREN) 25 MG EC tablet diclofenac sodium 25 mg tablet,delayed release   TAKE 1 TABLET BY MOUTH TWO TIMES A DAY FOR PAIN OR INFLAMMATION    fluticasone (FLONASE) 50 MCG/ACT nasal spray As Needed    gabapentin (NEURONTIN) 300 mg, 3 Times Daily    glipiZIDE (GLUCOTROL) 5 MG tablet 1 tablet, Daily    hydrochlorothiazide (HYDRODIURIL) 25 MG tablet 1 tablet, Daily    HYDROcodone-acetaminophen (NORCO) 5-325 MG per tablet TAKE 1 TABLET BY MOUTH THREE TIMES A DAY FOR PAIN    Insulin Glargine (Lantus SoloStar) 100 UNIT/ML injection pen Lantus Solostar U-100 Insulin 100 unit/mL (3 mL) subcutaneous pen   INJECT 50 UNITS  SUBCUTANEOUSLY EVERY DAY TO CONTROL BLOOD SUGAR    isosorbide mononitrate (IMDUR) 30 mg, Daily    Januvia 100 mg, Daily    lisinopril (PRINIVIL,ZESTRIL) 10 mg, Oral, Daily    meloxicam (MOBIC) 15 MG tablet No dose, route, or frequency recorded.    meloxicam (MOBIC) 7.5 MG tablet meloxicam 7.5 mg tablet   TAKE 1 TABLET BY MOUTH TWO TIMES A DAY FOR PAIN OR INFLAMMATION    methocarbamol (ROBAXIN) 500 MG tablet TAKE 1 TABLET BY MOUTH TWO TIMES A DAY FOR MUSCLE SPASMS    metoprolol succinate XL (TOPROL-XL) 25 mg, Daily    nitroglycerin (NITROSTAT) 0.4 mg, Every 5 Minutes PRN    Omega-3 Fatty Acids (FISH OIL) 1000 MG capsule capsule 2 Times Daily    ONE TOUCH ULTRA TEST test strip No dose, route, or frequency recorded.    pantoprazole (PROTONIX) 40 MG EC tablet 1 tablet, Daily    pioglitazone (ACTOS) 15 mg, Oral, Daily    sildenafil (REVATIO) 20 MG tablet As Needed    simvastatin (ZOCOR) 40 mg, Oral, Daily    Ventolin  (90 Base) MCG/ACT inhaler INHALE 2 PUFFS FOUR TIMES A DAY AS NEEDED FOR BREATHING       HPI: Mr. Beck is a 60 y/o male with above noted history and recent cath with MARY JANE placement at [facility] on 1/13/25 who is seen in consultation for chest pain. He has history of sarcoidosis with possible cardiac involvement. Patient passed out last November and was found to be in complete heart block per records,  s/p PPM/ICD placement 11/2024 by Dr. Wise. Echo at the time with normal LVEF, no significant valvular abnormalities. Underwent stress MPS for chest pain earlier this month which was abnormal, subsequent cath 1/13 with MARY JANE placement to the RCA x3, mild nonobstructive disease otherwise. He had some improvement in his chest pain for about half day after the intervention, however his pain returned and was worse than prior over the weekend. Presented to our hospital for further evaluation. HS troponins negative x2, EKGs are V paced. He currently has Nitropaste on and reports his chest pain is better. He  "wishes to switch all his care to our facility.       ROS: All systems have been reviewed and are negative with the exception of those mentioned in the HPI and problem list above.    Surgical History:   Past Surgical History:   Procedure Laterality Date    APPENDECTOMY      CARDIAC CATHETERIZATION      A.  Negative cardiac catheterization with ejection fraction of 55% in 2007.    CHOLECYSTECTOMY         Social History:   Social History     Socioeconomic History    Marital status:    Tobacco Use    Smoking status: Never    Smokeless tobacco: Never   Vaping Use    Vaping status: Never Used   Substance and Sexual Activity    Alcohol use: No    Drug use: No    Sexual activity: Defer       Family History:   Family History   Problem Relation Age of Onset    Diabetes Mother     Hypertension Mother     COPD Mother        Objective     /75 (BP Location: Right arm, Patient Position: Lying)   Pulse 64   Temp 98.2 °F (36.8 °C) (Oral)   Resp 16   Ht 180 cm (70.87\")   Wt 90.7 kg (200 lb)   SpO2 99%   BMI 28.00 kg/m²     Intake/Output Summary (Last 24 hours) at 1/20/2025 0900  Last data filed at 1/19/2025 2200  Gross per 24 hour   Intake 200 ml   Output --   Net 200 ml       PHYSICAL EXAM:  Physical Exam   General: No acute distress, well-developed and well-nourished.    Skin: Skin is warm and dry. No obvious cyanosis, erythema or pallor.   HEENT: Atraumatic, normocephalic, no conjunctival pallor, no scleral icterus.   Neck: Supple, no JVD. Normal carotid upstrokes, no bruits.    Chest: No respiratory distress. No chest wall tenderness. Breath sounds are normal. No wheezes,  rhonchi or rales.  Cardiovascular: Normal S1 and S2, no murmur, gallop or rub. PMI is not displaced.    Pulses: Radial and pedal pulses are 2+ and symmetric.    Abdomen: Soft, nontender, normal bowel sounds.   Musculoskeletal/Extremities: No clubbing, cyanosis or edema. No gross deformity.  Right wrist stable post recent cath, bruising and " small palpable lump is noted at the access site.  Neurological: Alert and oriented to person, place, and time, no gross focal deficits.   Psychiatric: Normal mood and affect. Speech and behavior is normal.      Labs/Diagnostic Data  Results from last 7 days   Lab Units 01/19/25  1756   SODIUM mmol/L 137   POTASSIUM mmol/L 3.6   CHLORIDE mmol/L 100   CO2 mmol/L 25.0   BUN mg/dL 7*   CREATININE mg/dL 0.86   GLUCOSE mg/dL 222*   CALCIUM mg/dL 9.0     Results from last 7 days   Lab Units 01/19/25  1921 01/19/25  1756   HSTROP T ng/L 8 7     Results from last 7 days   Lab Units 01/19/25  1756   WBC 10*3/mm3 9.21   HEMOGLOBIN g/dL 12.3*   HEMATOCRIT % 35.8*   PLATELETS 10*3/mm3 291                     Results from last 7 days   Lab Units 01/19/25  1756   PROBNP pg/mL 206.9           I personally reviewed the patient's EKG/Telemetry data.  EKG shows sinus rhythm with ventricular paced rhythm.    Radiology Data:   XR Chest 1 View    Result Date: 1/19/2025  Impression: No acute cardiopulmonary process. Electronically Signed: Yifan Hein MD  1/19/2025 6:30 PM EST  Workstation ID: [id number]       Current Medications:    aspirin, 81 mg, Oral, Daily  atorvastatin, 40 mg, Oral, Daily  clopidogrel, 75 mg, Oral, Daily  gabapentin, 300 mg, Oral, TID  insulin lispro, 2-9 Units, Subcutaneous, 4x Daily AC & at Bedtime  isosorbide mononitrate, 30 mg, Oral, Daily  metoprolol succinate XL, 25 mg, Oral, Daily  pantoprazole, 40 mg, Oral, Daily  sodium chloride, 10 mL, Intravenous, Q12H           Assessment:   1. CAD   - abnormal stress MPS 1/8 with subsequent cath and placement of MARY JANE x3 to RCA 1/13/25 at [facility], mild nonobstructive disease otherwise, now presenting with ongoing chest pain/unstable angina  - echo 11/2024 with normal EF  2. Complete heart block, s/p [employer] PPM/ICD  3. Sarcoidosis with possible cardiac involvement   4. Hypertension   5. Hyperlipidemia     Plan:   He has ruled out for MI, however has persistent  and worsening chest pain after recent stent placements. EKG is nondiagnostic due to ventricular paced rhythm.  We have recommended cardiac catheterization study for further/optimal evaluation.  This is to be followed by PCI if indicated.  We will proceed via left radial approach.     Will have device interrogated as he is 100% paced with very wide QRS complex, and we will arrange for  [facility] EP to follow PPM/ICD after discharge per patient request..   Continue current medical therapy with DAPT, statin, BB and Imdur, med Rx will be further optimized following procedure.  Further recommendations to follow.   Thank you for this consultation.    Scribed for Katiana Gilmore MD by Bettina Taylor PA-C. 1/20/2025  09:58 EST    IKatiana MD, personally performed the services described in this documentation as scribed by the above named individual in my presence, and it is both accurate and complete.  1/20/2025  10:36 EST

## 2025-01-20 NOTE — PLAN OF CARE
Problem: Adult Inpatient Plan of Care  Goal: Readiness for Transition of Care  Intervention: Mutually Develop Transition Plan  Recent Flowsheet Documentation  Taken 1/20/2025 0740 by Jazzy Bailey, RN  Transportation Anticipated: family or friend will provide  Patient/Family Anticipated Services at Transition: none  Patient/Family Anticipates Transition to: home with family  Taken 1/20/2025 0000 by Jazzy Bailey, RN  Equipment Currently Used at Home: none   Goal Outcome Evaluation:

## 2025-01-20 NOTE — ED NOTES
[facility]    Nursing Report ED to Floor:  Mental status: A/Ox4  Ambulatory status: Standby  Oxygen Therapy:  RA  Cardiac Rhythm: NSR  Admitted from: Home  Safety Concerns:  NA  Social Issues: Family at bedside   ED Room #:  14    ED Nurse Phone Extension - [phone] or may call [phone].      HPI:   Chief Complaint   Patient presents with    Chest Pain       Past Medical History:  Past Medical History:   Diagnosis Date    History of bronchoscopy 03/21/2012    CATRACHITA,lingula,LLL and superior segment of LLL revealed no endobronchial lesions.RUL,bronchus intermedius,RLL,superior segment of RLL again revealed no endobronchial lesions    History of chest x-ray 12/04/2014    There is some straightening of the left cardiac border but no cardiomegaly.There are degenerative changes of the thoracic spine with anterior lipping.There are a few calcified nodules bilaterally consistent with old granulomatous disease.Anterior paratracheal fullness cannot be excluded but no effusions, infiltrates or adenopathy    History of chest x-ray 11/12/2012    CT ratio is 15/35.The cardiac silhouette appears within normal limits of size.No effusions, infiltrates or consolidations    History of chest x-ray 02/27/2012    Enlargement of the right paratracheal soft tissues which can be seen with prominent mediastinal fat or mediastinal lymphadenopathy. No acute airspace consolidation    History of PFTs 12/21/2015    No obstruction. No restriction. Normal diffusion    History of PFTs 12/04/2014    No obstruction. No restriction. Normal diffusion    History of PFTs 11/18/2013    No obstruction. No restriction. Normal diffusion        Past Surgical History:  Past Surgical History:   Procedure Laterality Date    APPENDECTOMY      CARDIAC CATHETERIZATION      A.  Negative cardiac catheterization with ejection fraction of 55% in 2007.    CHOLECYSTECTOMY          Admitting Doctor:   eHlio Burdick III, DO    Consulting Provider(s):  Consults       No  orders found from 12/21/2024 to 1/20/2025.             Admitting Diagnosis:   The primary encounter diagnosis was Chest pain, unspecified type. A diagnosis of History of coronary artery disease was also pertinent to this visit.    Most Recent Vitals:   Vitals:    01/19/25 1930 01/19/25 2001 01/19/25 2015 01/19/25 2031   BP: 134/77 129/77 129/77 143/94   Patient Position:       Pulse: 72 76 74 85   Resp:       Temp:       TempSrc:       SpO2: 99% 98%  97%   Weight:       Height:           Active LDAs/IV Access:   Lines, Drains & Airways       Active LDAs       Name Placement date Placement time Site Days    Peripheral IV 01/19/25 1802 Anterior;Right Forearm 01/19/25  1802  Forearm  less than 1                    Labs (abnormal labs have a star):   Labs Reviewed   COMPREHENSIVE METABOLIC PANEL - Abnormal; Notable for the following components:       Result Value    Glucose 222 (*)     BUN 7 (*)     All other components within normal limits    Narrative:     GFR Categories in Chronic Kidney Disease (CKD)      GFR Category          GFR (mL/min/1.73)    Interpretation  G1                     90 or greater         Normal or high (1)  G2                      60-89                Mild decrease (1)  G3a                   45-59                Mild to moderate decrease  G3b                   30-44                Moderate to severe decrease  G4                    15-29                Severe decrease  G5                    14 or less           Kidney failure          (1)In the absence of evidence of kidney disease, neither GFR category G1 or G2 fulfill the criteria for CKD.    eGFR calculation 2021 CKD-EPI creatinine equation, which does not include race as a factor   CBC WITH AUTO DIFFERENTIAL - Abnormal; Notable for the following components:    Hemoglobin 12.3 (*)     Hematocrit 35.8 (*)     Immature Grans % 0.9 (*)     Monocytes, Absolute 0.92 (*)     Immature Grans, Absolute 0.08 (*)     All other components within normal  limits   TROPONIN - Normal    Narrative:     High Sensitive Troponin T Reference Range:  <14.0 ng/L- Negative Female for AMI  <22.0 ng/L- Negative Male for AMI  >=14 - Abnormal Female indicating possible myocardial injury.  >=22 - Abnormal Male indicating possible myocardial injury.   Clinicians would have to utilize clinical acumen, EKG, Troponin, and serial changes to determine if it is an Acute Myocardial Infarction or myocardial injury due to an underlying chronic condition.        LIPASE - Normal   BNP (IN-HOUSE) - Normal    Narrative:     This assay is used as an aid in the diagnosis of individuals suspected of having heart failure. It can be used as an aid in the diagnosis of acute decompensated heart failure (ADHF) in patients presenting with signs and symptoms of ADHF to the emergency department (ED). In addition, NT-proBNP of <300 pg/mL indicates ADHF is not likely.    Age Range Result Interpretation  NT-proBNP Concentration (pg/mL:      <50             Positive            >450                   Gray                 300-450                    Negative             <300    50-75           Positive            >900                  Gray                300-900                  Negative            <300      >75             Positive            >1800                  Gray                300-1800                  Negative            <300   HIGH SENSITIVITIY TROPONIN T 1HR - Normal    Narrative:     High Sensitive Troponin T Reference Range:  <14.0 ng/L- Negative Female for AMI  <22.0 ng/L- Negative Male for AMI  >=14 - Abnormal Female indicating possible myocardial injury.  >=22 - Abnormal Male indicating possible myocardial injury.   Clinicians would have to utilize clinical acumen, EKG, Troponin, and serial changes to determine if it is an Acute Myocardial Infarction or myocardial injury due to an underlying chronic condition.        RAINBOW DRAW    Narrative:     The following orders were created for panel order  [place] Draw.  Procedure                               Abnormality         Status                     ---------                               -----------         ------                     Green Top (Gel)[[id number]]                                  Final result               Lavender Top[[id number]]                                     Final result               Gold Top - SST[[id number]]                                   Final result               Rivas Top[[id number]]                                         Final result               Light Blue Top[[id number]]                                   Final result                 Please view results for these tests on the individual orders.   CBC AND DIFFERENTIAL    Narrative:     The following orders were created for panel order CBC & Differential.  Procedure                               Abnormality         Status                     ---------                               -----------         ------                     CBC Auto Differential[[id number]]        Abnormal            Final result                 Please view results for these tests on the individual orders.   GREEN TOP   LAVENDER TOP   GOLD TOP - SST   GRAY TOP   LIGHT BLUE TOP       Meds Given in ED:   Medications   sodium chloride 0.9 % flush 10 mL (has no administration in time range)   nitroglycerin (NITROSTAT) SL tablet 0.4 mg (0.4 mg Sublingual Given 1/19/25 2015)   aspirin chewable tablet 324 mg (324 mg Oral Given 1/19/25 1800)   nitroglycerin (NITROSTAT) ointment 0.5 inch (0.5 inches Topical Given 1/19/25 2038)           Last NIH score:                                                          Dysphagia screening results:        Eugenio Coma Scale:  No data recorded     CIWA:        Restraint Type:            Isolation Status:  No active isolations

## 2025-01-20 NOTE — PROGRESS NOTES
Pt. Referred for Phase II Cardiac Rehab. Staff discussed benefits of exercise, program protocol, and educational material provided. Teach back verified.  Patient refused cardiac rehab at this time due to ongoing back issues. Patient educated on AHA guidelines for home exercise and given home exercise sheet. Pt given brochure for [facility] if they have any further questions about cardiac rehab.

## 2025-01-20 NOTE — CASE MANAGEMENT/SOCIAL WORK
Discharge Planning Assessment  [facility]     Patient Name: Juan F Beck  MRN: [id number]  Today's Date: 1/20/2025    Admit Date: 1/19/2025    Plan: Home   Discharge Needs Assessment       Row Name 01/20/25 1333       Living Environment    People in Home spouse    Name(s) of People in Home Danuta Beck    Current Living Arrangements home    Potentially Unsafe Housing Conditions none    Primary Care Provided by self    Provides Primary Care For no one    Family Caregiver if Needed spouse    Family Caregiver Names Danuta Beck    Quality of Family Relationships involved;helpful;supportive    Able to Return to Prior Arrangements yes       Resource/Environmental Concerns    Resource/Environmental Concerns none    Transportation Concerns none       Transition Planning    Patient/Family Anticipates Transition to home    Patient/Family Anticipated Services at Transition none    Transportation Anticipated family or friend will provide       Discharge Needs Assessment    Readmission Within the Last 30 Days no previous admission in last 30 days    Equipment Currently Used at Home glucometer;bp cuff    Concerns to be Addressed denies needs/concerns at this time;discharge planning    Anticipated Changes Related to Illness none    Equipment Needed After Discharge none    Discharge Coordination/Progress Home                   Discharge Plan       Row Name 01/20/25 3299       Plan    Plan Home    Patient/Family in Agreement with Plan yes    Plan Comments Patient has discharge orders placed, patient had HC today.  Spoke with patient at bedside regarding discharge planning.  Patient denies use of HH and reports she has a Blood Pressure Machine and uses a Dexcom to track his glucose.  Patient lives with his spouse in a mobile home with three steps to enter.  Patient reports he is independent at home.  No discharge needs verbalized.  CM following.  Patient plan is to discharge home today via car with family to transport.    Final  Discharge Disposition Code 01 - home or self-care                  Continued Care and Services - Admitted Since 1/19/2025    No active coordination exists for this encounter.       Expected Discharge Date and Time       Expected Discharge Date Expected Discharge Time    Jan 20, 2025            Demographic Summary       Row Name 01/20/25 1331       General Information    Admission Type observation    Arrived From home    Referral Source admission list    Reason for Consult discharge planning    Preferred Language English    General Information Comments Yifan Beard MD       Contact Information    Permission Granted to Share Info With     Contact Information Comments Danuta Beck, spouse  [phone]                   Functional Status       Row Name 01/20/25 1332       Functional Status    Usual Activity Tolerance good    Current Activity Tolerance good       Functional Status, IADL    Medications independent    Meal Preparation independent    Housekeeping independent    Laundry independent    Shopping independent       Employment/    Employment/ Comments [employer]                   Psychosocial    No documentation.                  Abuse/Neglect    No documentation.                  Legal    No documentation.                  Substance Abuse    No documentation.                  Patient Forms    No documentation.                     Jada Arana RN

## 2025-01-20 NOTE — CONSULTS
[facility]  CARDIAC ELECTROPHYSIOLOGY CONSULTATION NOTE    Juan F Beck  : 1965  MRN:[id number]  Home Phone:[phone]    Date of Admission:2025  Date of Consultation: 25  Primary Provider:  Yifan Andrews MD    Referring Provider: No ref. provider found  Reason for Consultation: ICD management    IDENTIFICATION: A 59 y.o. male from [facility]    CC:   Chief Complaint   Patient presents with    Chest Pain       PROBLEM LIST:   CAD  Stress MPS 25: moderate sized  apical inferior ischemia, moderate sized fixed defect in inferior wall consistent with diaphragmatic attenuation, normal LVEF  LHC at [facility] 25: significant RCA disease from proximal to distal segment, s/p placement of MARY JANE x3   Complete heart block and syncope 2024  Thought related to cardiac sarcoidosis  Echo 2024: Normal LVEF, mild asymmetric LVH, no significant valvular disease  Inducible VT/VF on EP study (Dr. Wise)  S/p [employer] DC ICD 2024 [facility]  Sarcoidosis  Atrial Fibrillation  Single 2 hr episode 2025, no AC initiated d/t brevity of event  Hypertension  Hyperlipidemia  DM2    ALLERGIES:   Allergies   Allergen Reactions    Celecoxib Swelling         HPI:   Mr. Beck is a 59-year-old male with the above medical history who cardiac EP was asked to evaluate regarding history of complete heart block and inducible VT/VF s/p dual-chamber ICD.  The patient is requesting transfer of general/interventional as well as cardiac EP care to [facility].  He initially presented to [facility] with complaints of chest pain. He just recently underwent left heart catheterization at [place] showing significant RCA disease s/p stents x3.  Dr. Gilmore performed left heart catheterization today showing no new or obstructive disease.  On device interrogation, the patient did have a 2-hour episode of atrial fibrillation on  which would be a new diagnosis. Today, the patient reports  intermittent chest pain but otherwise denies complaints.      ROS: All systems have been reviewed and are negative with the exception of those mentioned in the HPI and problem list above.    HOME MEDICINES:   Current Outpatient Medications   Medication Instructions    allopurinol (ZYLOPRIM) 300 MG tablet 1 tablet, Daily    ASPIR-LOW 81 MG EC tablet 1 tablet, Daily    atorvastatin (LIPITOR) 40 mg, Daily    BASAGLAR KWIKPEN 10 Units, Daily    clopidogrel (PLAVIX) 75 mg, Daily    diclofenac (VOLTAREN) 25 MG EC tablet diclofenac sodium 25 mg tablet,delayed release   TAKE 1 TABLET BY MOUTH TWO TIMES A DAY FOR PAIN OR INFLAMMATION    fluticasone (FLONASE) 50 MCG/ACT nasal spray As Needed    gabapentin (NEURONTIN) 300 mg, 3 Times Daily    glipiZIDE (GLUCOTROL) 5 MG tablet 1 tablet, Daily    hydrochlorothiazide (HYDRODIURIL) 25 MG tablet 1 tablet, Daily    HYDROcodone-acetaminophen (NORCO) 5-325 MG per tablet TAKE 1 TABLET BY MOUTH THREE TIMES A DAY FOR PAIN    Insulin Glargine (Lantus SoloStar) 100 UNIT/ML injection pen Lantus Solostar U-100 Insulin 100 unit/mL (3 mL) subcutaneous pen   INJECT 50 UNITS SUBCUTANEOUSLY EVERY DAY TO CONTROL BLOOD SUGAR    isosorbide mononitrate (IMDUR) 30 mg, Daily    Januvia 100 mg, Daily    lisinopril (PRINIVIL,ZESTRIL) 10 mg, Oral, Daily    meloxicam (MOBIC) 15 MG tablet No dose, route, or frequency recorded.    meloxicam (MOBIC) 7.5 MG tablet meloxicam 7.5 mg tablet   TAKE 1 TABLET BY MOUTH TWO TIMES A DAY FOR PAIN OR INFLAMMATION    methocarbamol (ROBAXIN) 500 MG tablet TAKE 1 TABLET BY MOUTH TWO TIMES A DAY FOR MUSCLE SPASMS    metoprolol succinate XL (TOPROL-XL) 25 mg, Daily    nitroglycerin (NITROSTAT) 0.4 mg, Every 5 Minutes PRN    Omega-3 Fatty Acids (FISH OIL) 1000 MG capsule capsule 2 Times Daily    ONE TOUCH ULTRA TEST test strip No dose, route, or frequency recorded.    pantoprazole (PROTONIX) 40 MG EC tablet 1 tablet, Daily    pioglitazone (ACTOS) 15 mg, Oral, Daily     "sildenafil (REVATIO) 20 MG tablet As Needed    simvastatin (ZOCOR) 40 mg, Oral, Daily    Ventolin  (90 Base) MCG/ACT inhaler INHALE 2 PUFFS FOUR TIMES A DAY AS NEEDED FOR BREATHING       Surgical History:   Past Surgical History:   Procedure Laterality Date    APPENDECTOMY      CARDIAC CATHETERIZATION      A.  Negative cardiac catheterization with ejection fraction of 55% in 2007.    CHOLECYSTECTOMY         Social History:   Social History     Socioeconomic History    Marital status:    Tobacco Use    Smoking status: Never    Smokeless tobacco: Never   Vaping Use    Vaping status: Never Used   Substance and Sexual Activity    Alcohol use: No    Drug use: No    Sexual activity: Defer       Family History:   Family History   Problem Relation Age of Onset    Diabetes Mother     Hypertension Mother     COPD Mother        Objective     /83   Pulse 80   Temp 98.2 °F (36.8 °C) (Oral)   Resp 16   Ht 180 cm (70.87\")   Wt 90.7 kg (200 lb)   SpO2 97%   BMI 28.00 kg/m²     Intake/Output Summary (Last 24 hours) at 1/20/2025 1153  Last data filed at 1/19/2025 2200  Gross per 24 hour   Intake 200 ml   Output --   Net 200 ml       PHYSICAL EXAM:   CONSTITUTIONAL: Well nourished, cooperative, in no acute distress  CARDIOVASCULAR:  Regular rhythm and normal rate, no murmur, gallop, rub.   RESPIRATORY: Clear to auscultation, normal respiratory effort, no wheezing, rales or rhonchi  EXTREMITIES: No gross deformities, no edema. Peripheral pulses are present and equal bilaterally  SKIN: Warm, dry. No bleeding, bruising or rash  NEUROLOGICAL: No gross focal deficits  PSYCHIATRIC: Normal mood and affect. Behavior is normal     Labs/Diagnostic Data  Results from last 7 days   Lab Units 01/20/25  0908 01/19/25  1756   SODIUM mmol/L 139 137   POTASSIUM mmol/L 4.6 3.6   CHLORIDE mmol/L 103 100   CO2 mmol/L 27.0 25.0   BUN mg/dL 6* 7*   CREATININE mg/dL 0.84 0.86   GLUCOSE mg/dL 159* 222*   CALCIUM mg/dL 9.7 9.0 "     Results from last 7 days   Lab Units 01/20/25  0908 01/19/25  1921 01/19/25  1756   HSTROP T ng/L <6 8 7     Results from last 7 days   Lab Units 01/20/25  0908 01/19/25  1756   WBC 10*3/mm3 6.54 9.21   HEMOGLOBIN g/dL 12.1* 12.3*   HEMATOCRIT % 36.2* 35.8*   PLATELETS 10*3/mm3 250 291     Results from last 7 days   Lab Units 01/20/25  0908   MAGNESIUM mg/dL 1.7                 Results from last 7 days   Lab Units 01/19/25  1756   PROBNP pg/mL 206.9           I personally reviewed the patient's EKG/Telemetry data    Radiology Data:   XR Chest 1 View    Result Date: 1/19/2025  Impression: No acute cardiopulmonary process. Electronically Signed: Yifan Hein MD  1/19/2025 6:30 PM EST  Workstation ID: [id number]       Current Medications:    aspirin, 81 mg, Oral, Daily  atorvastatin, 40 mg, Oral, Daily  clopidogrel, 75 mg, Oral, Daily  gabapentin, 300 mg, Oral, TID  insulin lispro, 2-9 Units, Subcutaneous, 4x Daily AC & at Bedtime  isosorbide mononitrate, 30 mg, Oral, Daily  metoprolol succinate XL, 25 mg, Oral, Daily  pantoprazole, 40 mg, Oral, Daily  sodium chloride, 10 mL, Intravenous, Q12H           Manual Device Interrogation 1/20/2025:   [employer] dual-chamber ICD with 8.5 years left on the battery, acceptable threshold impedance values, and 65% AMS including a 2-hour atrial fibrillation episode January 6.  No VT    Assessment and Plan:   Complete AV block/History of Inducible VT on EP study (Dr. Wise)  [employer] DC ICD 11/11/2024 (Dr. Wise)  100% ventricular pacing due to underlying complete heart block.  Will need to consider device upgrade to resynchronization device with any reduction in LVEF in the future  Paroxysmal Atrial Fibrillation  Single 2 hr episode 1/6/2025. No anticoagulation  CHADSVASC=3. No anticoagulation d/t brevity of event but will need to start with significant recurrence  Sarcoidosis  Patient confirmed diagnosis in the lungs via biopsy, but is unsure if he has had any testing to confirm  cardiac involvement.  Denies any heart specific scans  Need to investigate this further outpatient with PET scan      Patient stable from EP standpoint. We will follow up at Dr. Sosa's outpatient [facility] clinic in ~3 months    Electronically signed by Michael Dupont PA-C, 01/20/25, 2:52 PM EST.

## 2025-01-20 NOTE — H&P
[facility]  HISTORY AND PHYSICAL    Patient Name: Juan F Beck  : 1965  MRN: [id number]  Primary Care Physician: Yifan Andrews MD  Date of admission: 2025    Subjective   Subjective     Chief Complaint:  Chest pain    HPI:  Juan F Beck is a 59 y.o. male with history of anxiety, hyperlipidemia, gout, sarcoidosis, DM 2, third-degree heart block in November with permanent pacemaker and ICD placement presents to the ED tonight for chest pain.  Patient was seen on Monday in [facility] where he received a heart cath with 3 stents placed.  He went home and was feeling a little bit better for about half a day and then pain started back.  It is increased in intensity since then.  For started as a sharp pain across his left chest and now is more commonly the mid chest described as pressure.  He is using nitro x 3 with some relief.  At worst its severity 10/10, 7/10 over past couple days constant, 0/10 currently.  Some associated symptoms include weakness nausea anxiety and chills all intermittent.  Moving and walking makes pain worse.  Review of systems also positive for intermittent headaches, occasional palpitations, intermittent mild abdominal pain.  Otherwise negative.        Review of Systems   Constitutional:  Positive for chills and fatigue. Negative for diaphoresis and fever.   HENT:  Negative for congestion, ear pain, rhinorrhea and sore throat.    Eyes:  Negative for pain, discharge and redness.   Respiratory:  Negative for cough, shortness of breath and wheezing.    Cardiovascular:  Positive for chest pain and palpitations. Negative for leg swelling.   Gastrointestinal:  Positive for abdominal pain and nausea. Negative for abdominal distention, blood in stool, constipation, diarrhea and vomiting.   Genitourinary:  Negative for decreased urine volume, difficulty urinating and dysuria.   Skin:  Negative for color change and rash.   Neurological:   Positive for headaches. Negative for dizziness, seizures and numbness.                Personal History     Past Medical History:   Diagnosis Date    History of bronchoscopy 03/21/2012    CATRACHITA,lingula,LLL and superior segment of LLL revealed no endobronchial lesions.RUL,bronchus intermedius,RLL,superior segment of RLL again revealed no endobronchial lesions    History of chest x-ray 12/04/2014    There is some straightening of the left cardiac border but no cardiomegaly.There are degenerative changes of the thoracic spine with anterior lipping.There are a few calcified nodules bilaterally consistent with old granulomatous disease.Anterior paratracheal fullness cannot be excluded but no effusions, infiltrates or adenopathy    History of chest x-ray 11/12/2012    CT ratio is 15/35.The cardiac silhouette appears within normal limits of size.No effusions, infiltrates or consolidations    History of chest x-ray 02/27/2012    Enlargement of the right paratracheal soft tissues which can be seen with prominent mediastinal fat or mediastinal lymphadenopathy. No acute airspace consolidation    History of PFTs 12/21/2015    No obstruction. No restriction. Normal diffusion    History of PFTs 12/04/2014    No obstruction. No restriction. Normal diffusion    History of PFTs 11/18/2013    No obstruction. No restriction. Normal diffusion             Past Surgical History:   Procedure Laterality Date    APPENDECTOMY      CARDIAC CATHETERIZATION      A.  Negative cardiac catheterization with ejection fraction of 55% in 2007.    CHOLECYSTECTOMY         Family History:  family history includes Diabetes in his mother; Hypertension in his mother.     Social History:  reports that he has never smoked. He has never used smokeless tobacco. He reports that he does not drink alcohol and does not use drugs.  Social History     Social History Narrative    Not on file       Medications:  BASAGLAR KWIKPEN, HYDROcodone-acetaminophen, Insulin  Glargine, SITagliptin, albuterol sulfate HFA, allopurinol, aspirin, atorvastatin, clopidogrel, diclofenac, fish oil, fluticasone, gabapentin, glipizide, glucose blood, hydroCHLOROthiazide, isosorbide mononitrate, lisinopril, meloxicam, methocarbamol, metoprolol succinate XL, nitroglycerin, pantoprazole, pioglitazone, sildenafil, and simvastatin    Allergies   Allergen Reactions    Celecoxib Swelling       Objective   Objective     Vital Signs:   Temp:  [98.2 °F (36.8 °C)] 98.2 °F (36.8 °C)  Heart Rate:  [72-98] 85  Resp:  [16] 16  BP: (117-164)/(70-94) 143/94    Physical Exam   Constitutional: Awake, alert  Eyes: PERRL, sclerae anicteric, no conjunctival injection  HENT: NCAT, mucous membranes moist  Neck: Supple, no thyromegaly, no lymphadenopathy, trachea midline  Respiratory: Clear to auscultation bilaterally, nonlabored respirations   Cardiovascular: RRR, vpaced, no murmurs, rubs, or gallops, palpable pedal pulses bilaterally  Gastrointestinal: Positive bowel sounds, soft, nontender, nondistended  Musculoskeletal: mild bilateral ankle edema, no clubbing or cyanosis to extremities  Psychiatric: Appropriate affect, cooperative  Neurologic: Oriented x 3, strength symmetric in all extremities, Cranial Nerves grossly intact to confrontation, speech clear  Skin: No rashes     Result Review:  I have personally reviewed the results from the time of this admission to 1/19/2025 21:05 EST and agree with these findings:  [x]  Laboratory list / accordion  []  Microbiology  [x]  Radiology  [x]  EKG/Telemetry   [x]  Cardiology/Vascular   []  Pathology  [x]  Old records  []  Other:  Most notable findings include: c/o cp and recent stents    LAB RESULTS:      Lab 01/19/25  1756   WBC 9.21   HEMOGLOBIN 12.3*   HEMATOCRIT 35.8*   PLATELETS 291   NEUTROS ABS 5.16   IMMATURE GRANS (ABS) 0.08*   LYMPHS ABS 2.94   MONOS ABS 0.92*   EOS ABS 0.08   MCV 86.5         Lab 01/19/25  1756   SODIUM 137   POTASSIUM 3.6   CHLORIDE 100   CO2  25.0   ANION GAP 12.0   BUN 7*   CREATININE 0.86   EGFR 98.5   GLUCOSE 222*   CALCIUM 9.0         Lab 01/19/25  1756   TOTAL PROTEIN 6.5   ALBUMIN 4.1   GLOBULIN 2.4   ALT (SGPT) 17   AST (SGOT) 18   BILIRUBIN 0.5   ALK PHOS 48   LIPASE 17         Lab 01/19/25  1921 01/19/25  1756   PROBNP  --  206.9   HSTROP T 8 7                 Brief Urine Lab Results       None          Microbiology Results (last 10 days)       ** No results found for the last 240 hours. **            XR Chest 1 View    Result Date: 1/19/2025  XR CHEST 1 VW Date of Exam: 1/19/2025 5:59 PM EST Indication: Chest Pain Triage Protocol Comparison: December 2, 2020 Findings: A left subclavian pacemaker is in place. The lungs are clear. The heart and mediastinal contours appear normal. There is no pleural effusion. The pulmonary vasculature appears normal. The osseous structures appear intact.     Impression: Impression: No acute cardiopulmonary process. Electronically Signed: Yifan Hein MD  1/19/2025 6:30 PM EST  Workstation ID: [id number]         Assessment & Plan   Assessment & Plan       Chest pain    Hypertension    Diabetes    Juan F Beck is a 59 y.o. male with history of anxiety, hyperlipidemia, gout, sarcoidosis, DM 2, third-degree heart block in November with permanent pacemaker and ICD placement presents to the ED tonight for chest pain.  Patient was seen on Monday in [facility] where he received a heart cath with 3 stents placed.  Increasing pain over last few days.  Admitted to the hospitalist for further eval and treatment.      Chest pain  Last stress echo jan 8, lvef- 78%  We will defer repeat echo to cardiology  hold hep gtt for now, troponins negative, EKG negative for acute change  Continue aspirin/plavix  Cardiology to see in the a.m.  EKG in the morning  Telemetry  Trend troponin  Continue statin  We will add morphine if pain returns  Nitro as needed, currently has nitro paste  Npo after mn        Hypertension  Home  Therapy- meds recently changed r/t hypotension, on metoprolol and imdur  Stable on meds  Continue home meds-  Monitor BP, maintain SBP<160  Telemetry      Diabetes  Last a1c- unknown, current BG- 222  home meds- glipizide and januvia  hold  ssi  accuchecks        Time spent with this patient including but not limited to assessment, lab/image interpretation, planning, education, discussion with family, documentation:  55 minutes.    DVT prophylaxis:  scd    CODE STATUS:  full       Expected Discharge  Expected Discharge Date: 1/20/2025; Expected Discharge Time:         Signature: Electronically signed by OSCAR Lopez, 01/19/25, 9:21 PM EST    ------------------------------------    The patient was seen independently by the APC.  I was available for any questions or concerns.     Electronically signed by Helio Burdick III, DO, 01/19/25, 9:49 PM EST.

## 2025-01-20 NOTE — DISCHARGE SUMMARY
[facility] Medicine Services  DISCHARGE SUMMARY    Patient Name: Juan F Beck  : 1965  MRN: [id number]    Date of Admission: 2025  5:27 PM  Date of Discharge:  25  Primary Care Physician: Yifan Andrews MD    Consults       Date and Time Order Name Status Description    2025  7:38 AM Inpatient Cardiology Consult Completed             Hospital Course     Presenting Problem: chest pain    Active Hospital Problems    Diagnosis  POA    **Chest pain [R07.9]  Yes    Coronary artery disease involving native coronary artery of native heart with unstable angina pectoris [I25.110]  Unknown    Diabetes [E11.9]  Yes    Hypertension [I10]  Yes    Sarcoidosis [D86.9]  Unknown      Resolved Hospital Problems   No resolved problems to display.          Hospital Course:  Juan F Beck is a 59 y.o. male with history of anxiety, hyperlipidemia, gout, sarcoidosis, DM 2, third-degree heart block in November with permanent pacemaker and ICD placement presents to the ED tonight for chest pain.  Patient was seen on Monday in [place] where he received a heart cath with 3 stents placed.  Increasing pain over last few days.  Admitted to the hospitalist for further eval and treatment.      Chest pain  --continue ASA/plavi/xstatin  --trop trend noted   --EKG reviewed  --s/p PCI x3 at OSH ([facility]) last week, patient wishes to transfer care to our cards going forward    --cardiology elected to take patient for [facility]  and findings of nonobstructive CAD with patent RCA stents. No intervention done. Patient recommended to continue his cardiac medications and cardiology made adjustments to imdur as well as metoprolol. He will have f/u in [place] office with [facility]. He was cleared for discharge home       Hypertension  --continue on metoprolol imdur          Diabetes  --ok to resume home medications without changes       Discharge Follow Up Recommendations for outpatient  labs/diagnostics:   Cardiology in [place] office as they schedule     Day of Discharge     HPI:   See progress note dated same     Review of Systems  See progress note dated same     Vital Signs:   Temp:  [97.6 °F (36.4 °C)-98.2 °F (36.8 °C)] 97.9 °F (36.6 °C)  Heart Rate:  [64-98] 76  Resp:  [16] 16  BP: ()/(64-94) 107/66  Flow (L/min) (Oxygen Therapy):  [0-2] 0      Physical Exam:  See progress note dated same     Pertinent  and/or Most Recent Results     LAB RESULTS:      Lab 01/20/25  0908 01/19/25  1756   WBC 6.54 9.21   HEMOGLOBIN 12.1* 12.3*   HEMATOCRIT 36.2* 35.8*   PLATELETS 250 291   NEUTROS ABS 4.13 5.16   IMMATURE GRANS (ABS) 0.06* 0.08*   LYMPHS ABS 1.63 2.94   MONOS ABS 0.61 0.92*   EOS ABS 0.08 0.08   MCV 87.9 86.5         Lab 01/20/25  0908 01/19/25  1756   SODIUM 139 137   POTASSIUM 4.6 3.6   CHLORIDE 103 100   CO2 27.0 25.0   ANION GAP 9.0 12.0   BUN 6* 7*   CREATININE 0.84 0.86   EGFR 99.2 98.5   GLUCOSE 159* 222*   CALCIUM 9.7 9.0   MAGNESIUM 1.7  --          Lab 01/20/25  0908 01/19/25  1756   TOTAL PROTEIN 6.2 6.5   ALBUMIN 4.0 4.1   GLOBULIN 2.2 2.4   ALT (SGPT) 14 17   AST (SGOT) 13 18   BILIRUBIN 0.7 0.5   ALK PHOS 42 48   LIPASE  --  17         Lab 01/20/25  0908 01/19/25  1921 01/19/25  1756   PROBNP  --   --  206.9   HSTROP T <6 8 7                 Brief Urine Lab Results       None          Microbiology Results (last 10 days)       ** No results found for the last 240 hours. **            Cardiac Catheterization/Vascular Study    Addendum Date: 1/20/2025    FINAL IMPRESSION: Nonobstructive CAD involving multiple vessels without hemodynamic significant disease. Patent stents of the RCA. Acceptable IFR of the LAD (0.93 post (and normal IFR of the circumflex (1.0). Normal left ventricular systolic function, estimated EF 55%. RECOMMENDATIONS: Continue medical therapy and risk factor management. Evaluation for noncardiac etiology of symptoms in case of recurrent chest pain. Indications:  Chest pain/unstable angina with recent coronary stents. Access: Left radial Procedures: Left heart catheterization. Left ventriculogram. Selective coronary angiography. iFR assessment of the LAD. iFR assessment of the circumflex. Arterial site hemostasis with radial band. Procedure narrative: The patient was brought to the catheterization lab in a fasting condition.  Access site was prepped and draped in standard sterile fashion.  Lidocaine was injected and arterial access was obtained by percutaneous anterior wall puncture technique.  A 6 Telugu arterial sheath was placed. Above procedures were performed without complications. Following the angiograms additional heparin was given.  The left coronary artery was engaged with CLS 3.5 guide catheter.  A Volcano pressure wire was advanced, pressures were equalized and the wire was advanced into the LAD beyond the stenosis.  iFR of the LAD was 0.93.  The wire was removed.  The wire was then directed into the circumflex, IFR of the circumflex was 1.0.  The wire was removed.  Final angiograms were taken. At the conclusion the arterial sheath was removed and hemostasis was achieved.  The patient was transferred to the unit in a stable condition. Hemodynamic Findings: Heart Rate: 85/minute. LV pressure: 117/5-10 mmHg, on pull back no gradient was recorded across the aortic valve. Angiographic Findings: Bilateral coronary dominance. LM: Mild, 10-20% smooth mid segment plaque without occlusive disease.  The distal vessel has mild ectasia.  The left main gives off for separate branches.  LAD: 40% proximal eccentric stenosis.  The IFR of the LAD was acceptable at 0.93.  After the proximal 40% stenosis there is a 20% eccentric mid segment plaque.  No hemodynamic significant disease is noted.  The large first diagonal essentially originates from the very proximal LAD of the left main and is free of significant disease. LCX: Codominant vessel with a 40 to 50% focal mid segment  stenosis.  The IFR of the circumflex was normal at 1.0. Ramus intermedius: Very large vessel which is angiographically normal and free of significant disease. RCA: Codominant vessel which has a proximal stent that is widely patent.  There is a mid segment stent which is also widely patent.  Just beyond the mid segment stent there is tapering and the vessel diameter noted without occlusive disease and no more than 30% smooth narrowing.  After that there is a distal stent which is also patent.  Beyond the distal stent there is a 40% narrowing before the origin of the PDA and this lesion also appears nonocclusive.  All 3 stents are patent. LV: Left ventriculogram performed in 30 JONES projection revealed normal global and regional left ventricular systolic function with estimated ejection fraction of 55%.  No mitral regurgitation was noted. Complications: No acute procedure related complications.     Result Date: 1/20/2025  FINAL     Nonobstructive CAD involving multiple vessels without hemodynamic significant disease. Patent stents of the RCA. Acceptable IFR of the LAD (0.93 post (and normal IFR of the circumflex (1.0). Normal left ventricular systolic function, estimated EF 55%. RECOMMENDATIONS: Continue medical therapy and risk factor management. Evaluation for noncardiac etiology of symptoms in case of recurrent chest pain. Indications: Chest pain/unstable angina with recent coronary stents. Access: Left radial Procedures: Left heart catheterization. Left ventriculogram. Selective coronary angiography. Arterial site hemostasis with radial band. Procedure narrative: The patient was brought to the catheterization lab in a fasting condition.  Access site was prepped and draped in standard sterile fashion.  Lidocaine was injected and arterial access was obtained by percutaneous anterior wall puncture technique.  A 6 Malian arterial sheath was placed. Above procedures were performed without complications.  At the  conclusion the arterial sheath was removed and hemostasis was achieved.  The patient was transferred to the unit in a stable condition. Hemodynamic Findings: Heart Rate: 85/minute. LV pressure: 117/5-10 mmHg, on pull back no gradient was recorded across the aortic valve. Angiographic Findings: Bilateral coronary dominance. LM: Mild, 10-20% smooth mid segment plaque without occlusive disease.  The distal vessel has mild ectasia.  The left main gives off for separate branches.  LAD: 40% proximal eccentric stenosis.  The IFR of the LAD was acceptable at 0.93.  After the proximal 40% stenosis there is a 20% eccentric mid segment plaque.  No hemodynamic significant disease is noted.  The large first diagonal essentially originates from the very proximal LAD of the left main and is free of significant disease. LCX: Codominant vessel with a 40 to 50% focal mid segment stenosis.  The IFR of the circumflex was normal at 1.0. Ramus intermedius: Very large vessel which is angiographically normal and free of significant disease. RCA: Codominant vessel which has a proximal stent that is widely patent.  There is a mid segment stent which is also widely patent.  Just beyond the mid segment stent there is tapering and the vessel diameter noted without occlusive disease and no more than 30% smooth narrowing.  After that there is a distal stent which is also patent.  Beyond the distal stent there is a 40% narrowing before the origin of the PDA and this lesion also appears nonocclusive.  All 3 stents are patent. LV: Left ventriculogram performed in 30 JONES projection revealed normal global and regional left ventricular systolic function with estimated ejection fraction of 55%.  No mitral regurgitation was noted. Complications: No acute procedure related complications.     XR Chest 1 View    Result Date: 1/19/2025  XR CHEST 1 VW Date of Exam: 1/19/2025 5:59 PM EST Indication: Chest Pain Triage Protocol Comparison: December 2, 2020  Findings: A left subclavian pacemaker is in place. The lungs are clear. The heart and mediastinal contours appear normal. There is no pleural effusion. The pulmonary vasculature appears normal. The osseous structures appear intact.     Impression: No acute cardiopulmonary process. Electronically Signed: Yifan Hein MD  1/19/2025 6:30 PM EST  Workstation ID: [id number]    CARDIAC CATH - LEFT HEART CATH W/ POSSIBLE INTERVENTION    Result Date: 1/13/2025  Cardiac catheterization report Procedures performed Teaching Sedation: Júnior MAGDALENO MD have supervised and directed an independent trained nurse to assist in the provision and monitoring of moderate sedation -Ultrasound  guided right radial artery access -Left heart catheterization -Selective coronary angiography of the RCA.  Selective coronary angiography of the LMCA -IVUS of RCA -PTCA and PCI of RCA -Vascular closure using a A TR band Indication: Accelerated angina in a patient with positive stress test Access: Right radial closed with a TR band.   Coronary angiography Dominance: Right LMCA: Minimal plaque otherwise angiographically normal LAD: Has mild plaque mostly in its mid segment otherwise angiographically normal LCx: Has mild luminal irregularities otherwise angiographically normal RCA: Has 85-90% stenosis in its proximal segment, 70% stenosis in its mid segment, and 90% stenosis by IVUS in its distal segment. LVEDP:  9mmHg, there was no gradient across the aortic valve Procedure narrative Patient was brought to the Cath Lab in a stable condition and was draped in the usual sterile fashion.  Right radial access was obtained using modified Seldinger technique.  Right radial access was maintained using a 6 Ukrainian Terumo sheath. Left heart catheterization was completed using a safety J-wire and a 5French JR4 diagnostic catheter.  We then used the same catheter to cannulate the ostium of the RCA and angiograms were taken in different projections.  A  5French JL 3 5 catheter was then used to cannulate the ostium of left main coronary artery.  Angiogram was taken in different projections.  We then used a 6 Jamaican JR4 guiding catheter to engage the ostium of the RCA.  A run-through workhorse wire was used to cross the lesions in the RCA.  Intravascular ultrasound was used to evaluate the lesions and assess vessel diameter. A 2.0x15 compliant balloon was used for lesion preparation. We then deployed a 4.5x22mm MARY JANE in the proximal RCA, 4.5x15mm MARY JANE in the mid RCA, and 3.0x26mm in the distal RCA. A 5.0x12mm noncompliant balloon was used for stent optimization of the proximal RCA.  Final angiography demonstrated excellent angiographic results without any evidence of dissection or distal wire perforation. All equipments were removed and the right radial sheath was removed safely.  A TR band was used to achieve hemostasis over the right radial artery. Summary -Accelerated angina -1 vessel CAD -Normal LVEDP Recommendations -Risk factor modification -Dual antiplatelet therapy for 6 to 12 months (ASA + Brilinta) -Statin therapy for life -Optimize antianginals and guideline directed therapy for CAD -Cardiac rehabilitation -Outpatient cardiology clinic follow-up Complications: None    XR Chest 1 View    Result Date: 1/11/2025  PORTABLE CHEST  1/11/2025 3:29 PM HISTORY: Left-sided chest pain COMPARISON:  November 23, 2024 FINDINGS: A multi lead pacer overlies the left chest field.  The cardiac silhouette is normal in size. The mediastinal and hilar contours are unremarkable.  The lungs are clear. There is no pneumothorax. The visualized osseous structures demonstrate no acute abnormalities.    No acute cardiopulmonary process. Images reviewed, interpreted, and dictated by Dr. Amor Ramos. Transcribed by Irene AGUILERA (R), MARISA (R).                 Plan for Follow-up of Pending Labs/Results:     Discharge Details        Discharge Medications        Changes to  Medications        Instructions Start Date   HYDROcodone-acetaminophen 5-325 MG per tablet  Commonly known as: NORCO  What changed: See the new instructions.   7.5 tablets.      isosorbide mononitrate 60 MG 24 hr tablet  Commonly known as: IMDUR  What changed:   medication strength  how much to take   60 mg, Oral, Daily      metoprolol succinate XL 25 MG 24 hr tablet  Commonly known as: TOPROL-XL  What changed: how much to take   50 mg, Oral, Daily             Continue These Medications        Instructions Start Date   acetaminophen 500 MG tablet  Commonly known as: TYLENOL   500 mg, Oral, Every 6 Hours PRN, Patient takes as needed.       Aspir-Low 81 MG EC tablet  Generic drug: aspirin   1 tablet, Daily      atorvastatin 40 MG tablet  Commonly known as: LIPITOR   40 mg, Daily      clopidogrel 75 MG tablet  Commonly known as: PLAVIX   75 mg, Daily      gabapentin 300 MG capsule  Commonly known as: NEURONTIN   300 mg, 3 Times Daily      glipizide 5 MG tablet  Commonly known as: GLUCOTROL   1 tablet, Daily      Januvia 100 MG tablet  Generic drug: SITagliptin   100 mg, Daily      nitroglycerin 0.4 MG SL tablet  Commonly known as: NITROSTAT   0.4 mg, Every 5 Minutes PRN      pantoprazole 40 MG EC tablet  Commonly known as: PROTONIX   1 tablet, Daily      Ventolin  (90 Base) MCG/ACT inhaler  Generic drug: albuterol sulfate HFA   INHALE 2 PUFFS FOUR TIMES A DAY AS NEEDED FOR BREATHING             Stop These Medications      diclofenac 25 MG EC tablet  Commonly known as: VOLTAREN     fish oil 1000 MG capsule capsule     fluticasone 50 MCG/ACT nasal spray  Commonly known as: FLONASE     lisinopril 10 MG tablet  Commonly known as: PRINIVIL,ZESTRIL     meloxicam 15 MG tablet  Commonly known as: MOBIC     meloxicam 7.5 MG tablet  Commonly known as: MOBIC              Allergies   Allergen Reactions    Celecoxib Swelling         Discharge Disposition:  Home or Self Care    Diet:  Hospital:  Diet Order   Procedures     Diet: Cardiac, Diabetic; Healthy Heart (2-3 Na+); Consistent Carbohydrate; Fluid Consistency: Thin (IDDSI 0)            Activity:  As tolerated      Restrictions or Other Recommendations:  As tolerated       CODE STATUS:    Code Status and Medical Interventions: CPR (Attempt to Resuscitate); Full Support   Ordered at: 01/19/25 2129     Level Of Support Discussed With:    Patient     Code Status (Patient has no pulse and is not breathing):    CPR (Attempt to Resuscitate)     Medical Interventions (Patient has pulse or is breathing):    Full Support       No future appointments.    Additional Instructions for the Follow-ups that You Need to Schedule       Discharge Follow-up with Specialty: Dr Gilmore at [place] office; 6 Weeks   As directed      Specialty: Dr Gilmore at [place] office   Follow Up: 6 Weeks                      Hilary Franz MD  01/20/25      Time Spent on Discharge:  I spent  20  minutes on this discharge activity which included: face-to-face encounter with the patient, reviewing the data in the system, coordination of the care with the nursing staff as well as consultants, documentation, and entering orders.

## 2025-01-20 NOTE — CASE MANAGEMENT/SOCIAL WORK
Discharge Planning Assessment  [facility]     Patient Name: Juan F Beck  MRN: [id number]  Today's Date: 1/20/2025    Admit Date: 1/19/2025    Plan: Home   Discharge Needs Assessment       Row Name 01/20/25 1333       Living Environment    People in Home spouse    Name(s) of People in Home Danuta Beck    Current Living Arrangements home    Potentially Unsafe Housing Conditions none    Primary Care Provided by self    Provides Primary Care For no one    Family Caregiver if Needed spouse    Family Caregiver Names Danuta Beck    Quality of Family Relationships involved;helpful;supportive    Able to Return to Prior Arrangements yes       Resource/Environmental Concerns    Resource/Environmental Concerns none    Transportation Concerns none       Transition Planning    Patient/Family Anticipates Transition to home    Patient/Family Anticipated Services at Transition none    Transportation Anticipated family or friend will provide       Discharge Needs Assessment    Readmission Within the Last 30 Days no previous admission in last 30 days    Equipment Currently Used at Home glucometer;bp cuff    Concerns to be Addressed denies needs/concerns at this time;discharge planning    Anticipated Changes Related to Illness none    Equipment Needed After Discharge none    Discharge Coordination/Progress Home                   Discharge Plan       Row Name 01/20/25 1169       Plan    Plan Home    Patient/Family in Agreement with Plan yes    Plan Comments Patient has discharge orders placed, patient had HC today. Spoke with patient at bedside regarding discharge planning. Patient denies use of HH and reports she has a Blood Pressure Machine and uses a Dexcom to track his glucose. Patient lives with his spouse in a mobile home with three steps to enter. Patient reports he is independent at home. No discharge needs verbalized. CM following. Patient plan is to discharge home today via car with family to transport.    Final Discharge  Disposition Code 01 - home or self-care    Final Note Patient has discharge orders placed, patient had HC today. Spoke with patient at bedside regarding discharge planning. Patient denies use of HH and reports she has a Blood Pressure Machine and uses a Dexcom to track his glucose. Patient lives with his spouse in a mobile home with three steps to enter. Patient reports he is independent at home. No discharge needs verbalized. CM following. Patient plan is to discharge home today via car with family to transport.      Row Name 01/20/25 7528       Plan    Plan Home    Patient/Family in Agreement with Plan yes    Plan Comments Patient has discharge orders placed, patient had HC today.  Spoke with patient at bedside regarding discharge planning.  Patient denies use of HH and reports she has a Blood Pressure Machine and uses a Dexcom to track his glucose.  Patient lives with his spouse in a mobile home with three steps to enter.  Patient reports he is independent at home.  No discharge needs verbalized.  CM following.  Patient plan is to discharge home today via car with family to transport.    Final Discharge Disposition Code 01 - home or self-care                  Continued Care and Services - Admitted Since 1/19/2025    No active coordination exists for this encounter.       Expected Discharge Date and Time       Expected Discharge Date Expected Discharge Time    Jan 20, 2025            Demographic Summary       Row Name 01/20/25 1331       General Information    Admission Type observation    Arrived From home    Referral Source admission list    Reason for Consult discharge planning    Preferred Language English    General Information Comments Yifan Beard MD       Contact Information    Permission Granted to Share Info With     Contact Information Comments Danuta Beck, spouse  [phone]                   Functional Status       Row Name 01/20/25 1332       Functional Status    Usual Activity  Tolerance good    Current Activity Tolerance good       Functional Status, IADL    Medications independent    Meal Preparation independent    Housekeeping independent    Laundry independent    Shopping independent       Employment/    Employment/ Comments [employer]                   Psychosocial    No documentation.                  Abuse/Neglect    No documentation.                  Legal    No documentation.                  Substance Abuse    No documentation.                  Patient Forms    No documentation.                     Jada Arana RN

## 2025-01-20 NOTE — PROGRESS NOTES
[facility] Medicine Services  PROGRESS NOTE    Patient Name: Juan F Beck  : 1965  MRN: [id number]    Date of Admission: 2025  Primary Care Physician: Yifan Andrews MD    Subjective   Subjective     CC:  Chest pain    HPI:  Patient resting in bed, wife present at bedside as well. Reports chest pain intermittent through night but now resolved. Cards to see this morning    ROS  As above       Objective   Objective     Vital Signs:   Temp:  [97.6 °F (36.4 °C)-98.2 °F (36.8 °C)] 98.2 °F (36.8 °C)  Heart Rate:  [64-98] 64  Resp:  [16] 16  BP: (117-164)/(70-94) 121/75  Flow (L/min) (Oxygen Therapy):  [0] 0     Physical Exam:  GEN: NAD, resting in bed, awake  HEENT: on room air, atraumatic, normocephalic  NECK: supple, no masses  RESP: on room air, normal effort  CV: on tele, sinus rhythm  PSYCH: normal affect, appropriate  NEURO: awake, alert, no focal deficits noted  MSK: no edema noted  SKIN: no rashes noted       Results Reviewed:  LAB RESULTS:      Lab 25  0908 25  1756   WBC 6.54  --  9.21   HEMOGLOBIN 12.1*  --  12.3*   HEMATOCRIT 36.2*  --  35.8*   PLATELETS 250  --  291   NEUTROS ABS 4.13  --  5.16   IMMATURE GRANS (ABS) 0.06*  --  0.08*   LYMPHS ABS 1.63  --  2.94   MONOS ABS 0.61  --  0.92*   EOS ABS 0.08  --  0.08   MCV 87.9  --  86.5   HSTROP T <6 8 7         Lab 25  0908 25  1756   SODIUM 139 137   POTASSIUM 4.6 3.6   CHLORIDE 103 100   CO2 27.0 25.0   ANION GAP 9.0 12.0   BUN 6* 7*   CREATININE 0.84 0.86   EGFR 99.2 98.5   GLUCOSE 159* 222*   CALCIUM 9.7 9.0   MAGNESIUM 1.7  --          Lab 25  0908 25  1756   TOTAL PROTEIN 6.2 6.5   ALBUMIN 4.0 4.1   GLOBULIN 2.2 2.4   ALT (SGPT) 14 17   AST (SGOT) 13 18   BILIRUBIN 0.7 0.5   ALK PHOS 42 48   LIPASE  --  17         Lab 25  0908 25  1921 25  1756   PROBNP  --   --  206.9   HSTROP T <6 8 7                 Brief Urine Lab Results       None             Microbiology Results Abnormal       None            XR Chest 1 View    Result Date: 1/19/2025  XR CHEST 1 VW Date of Exam: 1/19/2025 5:59 PM EST Indication: Chest Pain Triage Protocol Comparison: December 2, 2020 Findings: A left subclavian pacemaker is in place. The lungs are clear. The heart and mediastinal contours appear normal. There is no pleural effusion. The pulmonary vasculature appears normal. The osseous structures appear intact.     Impression: Impression: No acute cardiopulmonary process. Electronically Signed: Yifan Hein MD  1/19/2025 6:30 PM EST  Workstation ID: [id number]         Current medications:  Scheduled Meds:aspirin, 81 mg, Oral, Daily  atorvastatin, 40 mg, Oral, Daily  clopidogrel, 75 mg, Oral, Daily  gabapentin, 300 mg, Oral, TID  insulin lispro, 2-9 Units, Subcutaneous, 4x Daily AC & at Bedtime  isosorbide mononitrate, 30 mg, Oral, Daily  metoprolol succinate XL, 25 mg, Oral, Daily  pantoprazole, 40 mg, Oral, Daily  sodium chloride, 10 mL, Intravenous, Q12H      Continuous Infusions:   PRN Meds:.  senna-docusate sodium **AND** polyethylene glycol **AND** bisacodyl **AND** bisacodyl    dextrose    dextrose    glucagon (human recombinant)    Morphine    nitroglycerin    sodium chloride    sodium chloride    sodium chloride    Assessment & Plan   Assessment & Plan     Active Hospital Problems    Diagnosis  POA    **Chest pain [R07.9]  Yes    Coronary artery disease involving native coronary artery of native heart with unstable angina pectoris [I25.110]  Unknown    Diabetes [E11.9]  Yes    Hypertension [I10]  Yes    Sarcoidosis [D86.9]  Unknown      Resolved Hospital Problems   No resolved problems to display.        Brief Hospital Course to date:  Juan F Beck is a 59 y.o. male with history of anxiety, hyperlipidemia, gout, sarcoidosis, DM 2, third-degree heart block in November with permanent pacemaker and ICD placement presents to the ED tonight for chest pain.  Patient was seen  on Monday in [facility] where he received a heart cath with 3 stents placed.  Increasing pain over last few days.  Admitted to the hospitalist for further eval and treatment.      Chest pain  --continue ASA/plavi/statin  --trop trend noted   --EKG reviewed  --cardiology planning to take for [facility] today   --s/p PCI x3 at OSH ([facility]) last week, patient wishes to transfer care to our cards going forward         Hypertension  --continue on metoprolol imdur  --monitor         Diabetes  Last a1c- unknown,   home meds- glipizide and januvia  hold  ssi  accuchecks    Expected Discharge Location and Transportation: home  Expected Discharge pending [facility]   Expected Discharge Date: 1/20/2025; Expected Discharge Time:      VTE Prophylaxis:  Mechanical VTE prophylaxis orders are present.         AM-PAC 6 Clicks Score (PT): 24 (01/20/25 4220)    CODE STATUS:   Code Status and Medical Interventions: CPR (Attempt to Resuscitate); Full Support   Ordered at: 01/19/25 5204     Level Of Support Discussed With:    Patient     Code Status (Patient has no pulse and is not breathing):    CPR (Attempt to Resuscitate)     Medical Interventions (Patient has pulse or is breathing):    Full Support       Hilary Franz MD  01/20/25

## 2025-01-22 ENCOUNTER — TELEPHONE (OUTPATIENT)
Dept: CARDIOLOGY | Facility: CLINIC | Age: 60
End: 2025-01-22
Payer: COMMERCIAL

## 2025-02-03 ENCOUNTER — TELEPHONE (OUTPATIENT)
Dept: CARDIOLOGY | Facility: CLINIC | Age: 60
End: 2025-02-03
Payer: COMMERCIAL

## 2025-02-03 NOTE — TELEPHONE ENCOUNTER
Returned pt call to discuss atorvastatin. Pt states that he started atorvastatin about 1/14/25 at the time of his first heart cath when stents were placed. States he always has some chest pain in the middle of his chest, but has noticed that it is worse with the atorvastatin and is more aching and stabbing around the left breast 3-4/10.   He stopped taking it for a couple of days at the end of January and the pain nearly resolved around the left breast. He restarted it 2/1/25 and the pain is back as before. Denies other muscles aching. Will make Dr. Gilmore aware for further instructions or changes.

## 2025-02-03 NOTE — TELEPHONE ENCOUNTER
Called and spoke with pt and advised him of Dr. Gilmore's recommendations. He verbalized understanding and no further questions at this time.

## 2025-02-11 ENCOUNTER — DOCUMENTATION (OUTPATIENT)
Dept: CARDIAC REHAB | Facility: HOSPITAL | Age: 60
End: 2025-02-11
Payer: COMMERCIAL

## 2025-02-11 NOTE — PROGRESS NOTES
Thank you for your referral to [facility].    Dr Wise referred, patient stated he was not interested.

## 2025-02-12 PROCEDURE — 93296 REM INTERROG EVL PM/IDS: CPT | Performed by: STUDENT IN AN ORGANIZED HEALTH CARE EDUCATION/TRAINING PROGRAM

## 2025-02-12 PROCEDURE — 93295 DEV INTERROG REMOTE 1/2/MLT: CPT | Performed by: STUDENT IN AN ORGANIZED HEALTH CARE EDUCATION/TRAINING PROGRAM

## 2025-02-28 NOTE — PROGRESS NOTES
[facility] Cardiology    Encounter Date: 2025    Patient ID: Juan F Beck is a 59 y.o. male.  : 1965     PCP: Yifan Andrews MD       Chief Complaint: Paroxysmal atrial fibrillation, Heart Block AV Complete, and Chest Pain      PROBLEM LIST:  CAD  Stress MPS 25: moderate sized  apical inferior ischemia, moderate sized fixed defect in inferior wall consistent with diaphragmatic attenuation, normal LVEF  LHC at [facility] 25: significant RCA disease from proximal to distal segment, s/p placement of MARY JANE x3   Complete heart block and syncope 2024  Thought related to cardiac sarcoidosis  Echo 2024: Normal LVEF, mild asymmetric LVH, no significant valvular disease  Inducible VT/VF  S/p dual chamber [employer] ICD placement at [facility], followed by Dr. Wise  Sarcoidosis  Hypertension  Hyperlipidemia  DM2    History of Present Illness  Patient presents today for 1 month hospital follow-up with a history of CAD, complete heart block, status post ICD and cardiac risk factors. Since last visit, patient has been doing better, states that he still has mild localized left pectoralis region discomfort however walks regularly and does not experience any chest pain with activities or exertion.  He still experiences soreness at the site of his ICD.  States that during his evaluation in [facility] sarcoidosis was suspected and he went to sarcoid clinic at  and the workup was negative.   Patient states that when he measures his blood pressure at home, it is typically in the 120's or 130's with occasional measurements in the 140's. Patient denies palpitations, edema, dizziness, and syncope.       Allergies   Allergen Reactions    Celecoxib Swelling         Current Outpatient Medications:     acetaminophen (TYLENOL) 500 MG tablet, Take 1 tablet by mouth Every 6 (Six) Hours As Needed for Mild Pain. Patient takes as needed., Disp: , Rfl:     ASPIR-LOW 81 MG EC tablet, 1 tablet  "Daily., Disp: , Rfl:     clopidogrel (PLAVIX) 75 MG tablet, Take 1 tablet by mouth Daily., Disp: , Rfl:     gabapentin (NEURONTIN) 300 MG capsule, Take 1 capsule by mouth 3 (Three) Times a Day., Disp: , Rfl:     glipiZIDE (GLUCOTROL) 5 MG tablet, 1 tablet Daily., Disp: , Rfl:     HYDROcodone-acetaminophen (NORCO) 5-325 MG per tablet, 7.5 tablets. (Patient taking differently: Take 7.5 tablets by mouth Every 6 (Six) Hours As Needed.), Disp: , Rfl:     isosorbide mononitrate (IMDUR) 60 MG 24 hr tablet, Take 1 tablet by mouth Daily., Disp: 90 tablet, Rfl: 1    JANUVIA 100 MG tablet, Take 1 tablet by mouth Daily., Disp: , Rfl:     metoprolol succinate XL (TOPROL-XL) 25 MG 24 hr tablet, Take 2 tablets by mouth Daily., Disp: 90 tablet, Rfl: 1    nitroglycerin (NITROSTAT) 0.4 MG SL tablet, Place 1 tablet under the tongue Every 5 (Five) Minutes As Needed for Chest Pain., Disp: , Rfl:     pantoprazole (PROTONIX) 40 MG EC tablet, Take 1 tablet by mouth Daily., Disp: , Rfl:     simvastatin (ZOCOR) 10 MG tablet, , Disp: , Rfl:     Ventolin  (90 Base) MCG/ACT inhaler, INHALE 2 PUFFS FOUR TIMES A DAY AS NEEDED FOR BREATHING, Disp: , Rfl:     losartan (Cozaar) 50 MG tablet, Take 1 tablet by mouth Every Night., Disp: 90 tablet, Rfl: 3    The following portions of the patient's history were reviewed and updated as appropriate: allergies, current medications, past family history, past medical history, past social history, past surgical history and problem list.    ROS  Review of Systems   14 point ROS negative except for that listed in the HPI.         Objective:     /88 (BP Location: Right arm, Patient Position: Sitting)   Pulse 65   Ht 180.3 cm (71\")   Wt 94.8 kg (209 lb)   SpO2 99%   BMI 29.15 kg/m²      Physical Exam  General: No apparent distress.  Neck: no JVD.  Chest:No respiratory distress, breath sounds are normal. No wheezes,  rhonchi or rales.  Cardiovascular: Normal S1 and S2, no murmur, gallop or rub.  "   Extremities: No edema.      Data Review:   Lab Results   Component Value Date    GLUCOSE 159 (H) 01/20/2025    BUN 6 (L) 01/20/2025    CREATININE 0.84 01/20/2025    EGFR 99.2 01/20/2025    BCR 7.1 01/20/2025     01/20/2025    K 4.6 01/20/2025    CO2 27.0 01/20/2025    CALCIUM 9.7 01/20/2025    ALBUMIN 4.0 01/20/2025    AST 13 01/20/2025    ALT 14 01/20/2025     Lab Results   Component Value Date    WBC 6.54 01/20/2025    RBC 4.12 (L) 01/20/2025    HGB 12.1 (L) 01/20/2025    HCT 36.2 (L) 01/20/2025    MCV 87.9 01/20/2025     01/20/2025        Advance Care Planning   ACP discussion was held with the patient during this visit. Patient does not have an advance directive, declines further assistance.      Assessment:      Diagnosis Plan   1. Coronary artery disease involving native coronary artery of native heart with stable angina pectoris  Stable with occasional chest discomfort unrelated to activities which appears atypical.  Continue current dual antiplatelet therapy and rest of the current medications including Imdur and metoprolol.       2. Heart block AV complete  Stable. S/p dual chamber [employer] ICD placement at [facility],       3. Paroxysmal atrial fibrillation  Stable after having a single episode, no recurrences, continue metoprolol..       4. Primary hypertension  Initiate losartan 50 mg nightly for better control of hypertension. Continue on metoprolol 50 mg daily.       5. Dyslipidemia  No recent labs for review. Continue on simvastatin 10 mg daily for dyslipidemia. Continue heart healthy diet and exercise.         Plan:   Stable cardiac status.  No current typical angina or CHF symptoms.    Initiate losartan 50 mg nightly for better control of hypertension.   Continue rest of the current medications.   FU in 6 MO with device check, sooner as needed.  Thank you for allowing us to participate in the care of your patient.     Scribed for Katiana Gilmore MD by Ruth Ramos. 3/4/2025 09:01 ELTON MAGDALENO  Katiana Gilmore MD, personally performed the services described in this documentation as scribed by the above named individual in my presence, and it is both accurate and complete.  3/4/2025  11:03 EST      Please note that portions of this note may have been completed with a voice recognition program. Efforts were made to edit the dictations, but occasionally words are mistranscribed.

## 2025-03-04 ENCOUNTER — OFFICE VISIT (OUTPATIENT)
Age: 60
End: 2025-03-04
Payer: COMMERCIAL

## 2025-03-04 VITALS
SYSTOLIC BLOOD PRESSURE: 158 MMHG | DIASTOLIC BLOOD PRESSURE: 88 MMHG | HEIGHT: 71 IN | OXYGEN SATURATION: 99 % | WEIGHT: 209 LBS | HEART RATE: 65 BPM | BODY MASS INDEX: 29.26 KG/M2

## 2025-03-04 DIAGNOSIS — I25.119 CORONARY ARTERY DISEASE INVOLVING NATIVE CORONARY ARTERY OF NATIVE HEART WITH ANGINA PECTORIS: Primary | ICD-10-CM

## 2025-03-04 DIAGNOSIS — I48.0 PAROXYSMAL ATRIAL FIBRILLATION: ICD-10-CM

## 2025-03-04 DIAGNOSIS — E78.5 DYSLIPIDEMIA: ICD-10-CM

## 2025-03-04 DIAGNOSIS — I10 PRIMARY HYPERTENSION: ICD-10-CM

## 2025-03-04 DIAGNOSIS — I44.2 HEART BLOCK AV COMPLETE: ICD-10-CM

## 2025-03-04 PROCEDURE — 3079F DIAST BP 80-89 MM HG: CPT | Performed by: INTERNAL MEDICINE

## 2025-03-04 PROCEDURE — 3077F SYST BP >= 140 MM HG: CPT | Performed by: INTERNAL MEDICINE

## 2025-03-04 PROCEDURE — 99214 OFFICE O/P EST MOD 30 MIN: CPT | Performed by: INTERNAL MEDICINE

## 2025-03-04 PROCEDURE — 1159F MED LIST DOCD IN RCRD: CPT | Performed by: INTERNAL MEDICINE

## 2025-03-04 PROCEDURE — 1160F RVW MEDS BY RX/DR IN RCRD: CPT | Performed by: INTERNAL MEDICINE

## 2025-03-04 RX ORDER — SIMVASTATIN 10 MG
TABLET ORAL
COMMUNITY
Start: 2025-02-20

## 2025-03-04 RX ORDER — LOSARTAN POTASSIUM 50 MG/1
50 TABLET ORAL NIGHTLY
Qty: 90 TABLET | Refills: 3 | Status: SHIPPED | OUTPATIENT
Start: 2025-03-04

## 2025-03-14 ENCOUNTER — TELEPHONE (OUTPATIENT)
Dept: CARDIOLOGY | Facility: CLINIC | Age: 60
End: 2025-03-14
Payer: COMMERCIAL

## 2025-03-14 NOTE — TELEPHONE ENCOUNTER
"Received call from pt stating that his blood pressures have been running lower since adding losartan to his meds at last office visit 3/4/25. BP staying around 102/50, but yesterday after 2-3 miles of walking it was 80s/40s. States he feels light-headed at times, not very often, and does not correlate this with low BP. Denies presyncope. Has chest pain but this is usual for him and \"doctor says it's muscles in my chest.\" He held losartan last night and today /70. Pt asking if needs to change losartan dose?   "

## 2025-03-17 RX ORDER — LOSARTAN POTASSIUM 25 MG/1
25 TABLET ORAL NIGHTLY
Qty: 90 TABLET | Refills: 0 | Status: SHIPPED | OUTPATIENT
Start: 2025-03-17

## 2025-03-17 RX ORDER — LOSARTAN POTASSIUM 50 MG/1
25 TABLET ORAL NIGHTLY
Start: 2025-03-17 | End: 2025-03-17 | Stop reason: SDUPTHER

## 2025-03-17 NOTE — TELEPHONE ENCOUNTER
Called pt to give him SUNI Gifford recommendation and he states he has been taking 25 mg (1/2 tab) since Friday night and he gave readings.   Fri PM  113/63  Sat AM  139/74, /66  Sun AM  126/76, /57  Mon AM  112/72    Pt reports feeling well. States his BP goes lower after he does walking exercise but not so low that he feels like he will pass out. Encouraged to drink water and stay hydrated, and to continue to monitor BP. He verbalized understanding and no further questions.  Will change losartan dose in med list per PA-C order.

## 2025-03-28 ENCOUNTER — TELEPHONE (OUTPATIENT)
Dept: CARDIOLOGY | Facility: CLINIC | Age: 60
End: 2025-03-28
Payer: COMMERCIAL

## 2025-03-28 NOTE — TELEPHONE ENCOUNTER
"Returned VM to pt concerning 2 episodes last night when he woke up gasping for air/unable to draw in breath. Has no other sick symptoms while awake. States always has some \"chest pain\" longstanding and Dr. Gilmore aware. States he uses an [employer] to download his readings. Unsure of what [employer] is called. Upon chart review it appears that Dr. Sosa had billing encounter in Feb. Transferred pt to Dr. Sosa's nurse for further guidance on how to review readings from last night to see if any cardiac events correlate with his symptoms. Pt voiced understanding.   "

## 2025-04-11 ENCOUNTER — TELEPHONE (OUTPATIENT)
Dept: CARDIOLOGY | Facility: CLINIC | Age: 60
End: 2025-04-11
Payer: COMMERCIAL

## 2025-04-11 NOTE — TELEPHONE ENCOUNTER
Received call from pt stating that he took by accident extra Imdur. After discussion it appears pt took one extra pill this morning at 10 am. At about 1345 pt took BP and 150/80s. C/o headache 5-6/10, but states he gets headaches. States has some chest pain but not anymore  or different than his usual amount of CP. Advised pt to continue to monitor BP and if develops more or worsening s/s and if BP continues to trend downward then he should go to the ER. He voiced understanding.

## 2025-04-14 RX ORDER — METOPROLOL SUCCINATE 25 MG/1
50 TABLET, EXTENDED RELEASE ORAL DAILY
Qty: 180 TABLET | Refills: 3 | Status: SHIPPED | OUTPATIENT
Start: 2025-04-14

## 2025-04-14 RX ORDER — ISOSORBIDE MONONITRATE 60 MG/1
60 TABLET, EXTENDED RELEASE ORAL DAILY
Qty: 90 TABLET | Refills: 3 | Status: SHIPPED | OUTPATIENT
Start: 2025-04-14 | End: 2026-04-14

## 2025-04-15 ENCOUNTER — TELEPHONE (OUTPATIENT)
Dept: CARDIOLOGY | Facility: CLINIC | Age: 60
End: 2025-04-15
Payer: COMMERCIAL

## 2025-04-15 NOTE — TELEPHONE ENCOUNTER
Remote transmission received today. Patient has a [employer] ICD programmed DDD 60.    Patient has 1 AMS episode (04/13-04/) EGM shows AF lasting 14 hours and 12 minutes in duration. Please see below.     Patient is not anticoagulated.     Please advise.

## 2025-04-17 NOTE — TELEPHONE ENCOUNTER
I spoke with patient. Patient agrees to start Eliquis and verbalized understanding. Patient states he got his medication mixed up on Sunday feels like this maybe the reason for the duration of his A-fib episode however is willing to start anticoagulation.     Medication sent to pharmacy via [employer] [facility] [facility].

## 2025-05-02 ENCOUNTER — OFFICE VISIT (OUTPATIENT)
Dept: CARDIOLOGY | Facility: CLINIC | Age: 60
End: 2025-05-02
Payer: COMMERCIAL

## 2025-05-02 VITALS
HEART RATE: 75 BPM | HEIGHT: 71 IN | OXYGEN SATURATION: 97 % | DIASTOLIC BLOOD PRESSURE: 82 MMHG | SYSTOLIC BLOOD PRESSURE: 140 MMHG | BODY MASS INDEX: 29.85 KG/M2 | WEIGHT: 213.2 LBS

## 2025-05-02 DIAGNOSIS — I48.0 PAROXYSMAL ATRIAL FIBRILLATION: Chronic | ICD-10-CM

## 2025-05-02 DIAGNOSIS — D86.9 SARCOIDOSIS: Chronic | ICD-10-CM

## 2025-05-02 DIAGNOSIS — Z95.810 ICD (IMPLANTABLE CARDIOVERTER-DEFIBRILLATOR), DUAL, IN SITU: ICD-10-CM

## 2025-05-02 DIAGNOSIS — I44.2 AV BLOCK, COMPLETE: Primary | Chronic | ICD-10-CM

## 2025-05-02 DIAGNOSIS — I47.20 VT (VENTRICULAR TACHYCARDIA): ICD-10-CM

## 2025-05-02 NOTE — PROGRESS NOTES
Cardiac Electrophysiology Outpatient Note  [facility]    Office Visit     Juan F Beck  [id number]  05/02/2025    Primary Care Physician: Yifan Andrews MD    Referred By: No ref. provider found    Subjective     Chief Complaint   Patient presents with    Paroxysmal atrial fibrillation     PROBLEM LIST:   CAD  Stress MPS 1/8/25: moderate sized  apical inferior ischemia, moderate sized fixed defect in inferior wall consistent with diaphragmatic attenuation, normal LVEF  LHC at [facility] 1/13/25: significant RCA disease from proximal to distal segment, s/p placement of MARY JANE x3   [facility] 1/2025 Dr. Gilmore: Nonobstructive CAD involving multiple vessels without hemodynamically significant disease, patent stents of the RCA  Complete heart block and syncope 11/2024  Thought related to cardiac sarcoidosis  Echo 11/2024: Normal LVEF, mild asymmetric LVH, no significant valvular disease  Inducible VT/VF on EP study (Dr. Wise)  S/p [place] DC ICD 11/2024 [facility]  Sarcoidosis  Atrial Fibrillation  Single 2 hr episode 1/2025, no AC initiated d/t brevity of event  Hypertension  Hyperlipidemia  DM2    History of Present Illness:   Juan F Beck is a 59 y.o. 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  We last saw the patient in consultation in January 2025 inpatient.  He had a 14-hour episode of atrial fibrillation detected on device transmission in April and Eliquis was started for stroke prophylaxis.  He is doing well from a cardiac standpoint he has some random chest discomfort associated with the ICD generator but no exertional chest pain.  He can walk 2 to 3 miles without issue.  He denies any shortness of breath, palpitations, edema or near/full syncope.    Past Medical History:   Diagnosis Date     Coronary artery disease     Diabetes mellitus     History of bronchoscopy 03/21/2012    CATRACHITA,lingula,LLL and superior segment of LLL revealed no endobronchial lesions.RUL,bronchus intermedius,RLL,superior segment of RLL again revealed no endobronchial lesions    History of chest x-ray 12/04/2014    There is some straightening of the left cardiac border but no cardiomegaly.There are degenerative changes of the thoracic spine with anterior lipping.There are a few calcified nodules bilaterally consistent with old granulomatous disease.Anterior paratracheal fullness cannot be excluded but no effusions, infiltrates or adenopathy    History of chest x-ray 11/12/2012    CT ratio is 15/35.The cardiac silhouette appears within normal limits of size.No effusions, infiltrates or consolidations    History of chest x-ray 02/27/2012    Enlargement of the right paratracheal soft tissues which can be seen with prominent mediastinal fat or mediastinal lymphadenopathy. No acute airspace consolidation    History of PFTs 12/21/2015    No obstruction. No restriction. Normal diffusion    History of PFTs 12/04/2014    No obstruction. No restriction. Normal diffusion    History of PFTs 11/18/2013    No obstruction. No restriction. Normal diffusion    Hypertension        Past Surgical History:   Procedure Laterality Date    APPENDECTOMY      CARDIAC CATHETERIZATION      A.  Negative cardiac catheterization with ejection fraction of 55% in 2007.    CARDIAC CATHETERIZATION N/A 01/20/2025    Procedure: Left Heart Cath;  Surgeon: Katiana Gilmore MD;  Location:  [facility] CATH INVASIVE LOCATION;  Service: Cardiovascular;  Laterality: N/A;    CARDIAC CATHETERIZATION  01/20/2025    Procedure: Functional Flow [place];  Surgeon: Katiana Gilmore MD;  Location:  [facility] CATH INVASIVE LOCATION;  Service: Cardiovascular;;    CARDIAC DEFIBRILLATOR PLACEMENT      CHOLECYSTECTOMY      CORONARY STENT PLACEMENT      INSERT / REPLACE / REMOVE PACEMAKER         Family  History   Problem Relation Age of Onset    Diabetes Mother     Hypertension Mother     COPD Mother        Social History     Socioeconomic History    Marital status:    Tobacco Use    Smoking status: Never    Smokeless tobacco: Never   Vaping Use    Vaping status: Never Used   Substance and Sexual Activity    Alcohol use: No    Drug use: No    Sexual activity: Not Currently     Partners: Female         Current Outpatient Medications:     acetaminophen (TYLENOL) 500 MG tablet, Take 1 tablet by mouth Every 6 (Six) Hours As Needed for Mild Pain. Patient takes as needed., Disp: , Rfl:     apixaban (ELIQUIS) 5 MG tablet tablet, Take 1 tablet by mouth 2 (Two) Times a Day., Disp: 60 tablet, Rfl: 6    clopidogrel (PLAVIX) 75 MG tablet, Take 1 tablet by mouth Daily., Disp: , Rfl:     gabapentin (NEURONTIN) 300 MG capsule, Take 1 capsule by mouth 3 (Three) Times a Day., Disp: , Rfl:     glipiZIDE (GLUCOTROL) 5 MG tablet, 1 tablet Daily., Disp: , Rfl:     HYDROcodone-acetaminophen (NORCO) 5-325 MG per tablet, 7.5 tablets. (Patient taking differently: Take 7.5 tablets by mouth Every 6 (Six) Hours As Needed.), Disp: , Rfl:     isosorbide mononitrate (IMDUR) 60 MG 24 hr tablet, Take 1 tablet by mouth Daily., Disp: 90 tablet, Rfl: 3    JANUVIA 100 MG tablet, Take 1 tablet by mouth Daily., Disp: , Rfl:     losartan (Cozaar) 25 MG tablet, Take 1 tablet by mouth Every Night., Disp: 90 tablet, Rfl: 0    metoprolol succinate XL (TOPROL-XL) 25 MG 24 hr tablet, Take 2 tablets by mouth Daily., Disp: 180 tablet, Rfl: 3    nitroglycerin (NITROSTAT) 0.4 MG SL tablet, Place 1 tablet under the tongue Every 5 (Five) Minutes As Needed for Chest Pain., Disp: , Rfl:     pantoprazole (PROTONIX) 40 MG EC tablet, Take 1 tablet by mouth Daily., Disp: , Rfl:     simvastatin (ZOCOR) 10 MG tablet, , Disp: , Rfl:     Ventolin  (90 Base) MCG/ACT inhaler, INHALE 2 PUFFS FOUR TIMES A DAY AS NEEDED FOR BREATHING, Disp: , Rfl:     Allergies:  "  Allergies   Allergen Reactions    Celecoxib Swelling       Objective   Vital Signs: Blood pressure 140/82, pulse 75, height 180.3 cm (71\"), weight 96.7 kg (213 lb 3.2 oz), SpO2 97%.    PHYSICAL EXAM  General appearance: Awake, alert, cooperative  Head: Normocephalic, without obvious abnormality, atraumatic  Lungs: Clear to ascultation bilaterally  Heart: Regular rate and rhythm, no murmurs, no lower extremity swelling  Skin: Skin color, turgor normal, no rashes or lesions  Neurologic: Grossly normal     Lab Results   Component Value Date    GLUCOSE 159 (H) 01/20/2025    CALCIUM 9.7 01/20/2025     01/20/2025    K 4.6 01/20/2025    CO2 27.0 01/20/2025     01/20/2025    BUN 6 (L) 01/20/2025    CREATININE 0.84 01/20/2025    BCR 7.1 01/20/2025    ANIONGAP 9.0 01/20/2025     Lab Results   Component Value Date    WBC 6.54 01/20/2025    HGB 12.1 (L) 01/20/2025    HCT 36.2 (L) 01/20/2025    MCV 87.9 01/20/2025     01/20/2025     Lab Results   Component Value Date    INR 0.96 01/11/2025    INR 1.03 11/23/2024    PROTIME 10.4 01/11/2025    PROTIME 11.1 11/23/2024     No results found for: \"TSH\", \"I8PYGOE\", \"S3SKNQL\", \"THYROIDAB\"          Results for orders placed during the hospital encounter of 01/19/25    Cardiac Catheterization/Vascular Study    Conclusion  FINAL    Impression  Nonobstructive CAD involving multiple vessels without hemodynamic significant disease.  Patent stents of the RCA.  Acceptable IFR of the LAD (0.93 post (and normal IFR of the circumflex (1.0).  Normal left ventricular systolic function, estimated EF 55%.    RECOMMENDATIONS:  Continue medical therapy and risk factor management.  Evaluation for noncardiac etiology of symptoms in case of recurrent chest pain.    Indications: Chest pain/unstable angina with recent coronary stents.    Access: Left radial    Procedures:  Left heart catheterization.  Left ventriculogram.  Selective coronary angiography.  iFR assessment of the LAD.  iFR " assessment of the circumflex.  Arterial site hemostasis with radial band.    Procedure narrative:  The patient was brought to the catheterization lab in a fasting condition.  Access site was prepped and draped in standard sterile fashion.  Lidocaine was injected and arterial access was obtained by percutaneous anterior wall puncture technique.  A 6 [place] arterial sheath was placed. Above procedures were performed without complications.  Following the angiograms additional heparin was given.  The left coronary artery was engaged with CLS 3.5 guide catheter.  A [place] pressure wire was advanced, pressures were equalized and the wire was advanced into the LAD beyond the stenosis.  iFR of the LAD was 0.93.  The wire was removed.  The wire was then directed into the circumflex, IFR of the circumflex was 1.0.  The wire was removed.  Final angiograms were taken.  At the conclusion the arterial sheath was removed and hemostasis was achieved.  The patient was transferred to the unit in a stable condition.    Hemodynamic Findings:  Heart Rate: 85/minute.  LV pressure: 117/5-10 mmHg, on pull back no gradient was recorded across the aortic valve.    Angiographic Findings:  Bilateral coronary dominance.  LM: Mild, 10-20% smooth mid segment plaque without occlusive disease.  The distal vessel has mild ectasia.  The left main gives off for separate branches.  LAD: 40% proximal eccentric stenosis.  The IFR of the LAD was acceptable at 0.93.  After the proximal 40% stenosis there is a 20% eccentric mid segment plaque.  No hemodynamic significant disease is noted.  The large first diagonal essentially originates from the very proximal LAD of the left main and is free of significant disease.  LCX: Codominant vessel with a 40 to 50% focal mid segment stenosis.  The IFR of the circumflex was normal at 1.0.  Ramus intermedius: Very large vessel which is angiographically normal and free of significant disease.  RCA: Codominant vessel  which has a proximal stent that is widely patent.  There is a mid segment stent which is also widely patent.  Just beyond the mid segment stent there is tapering and the vessel diameter noted without occlusive disease and no more than 30% smooth narrowing.  After that there is a distal stent which is also patent.  Beyond the distal stent there is a 40% narrowing before the origin of the PDA and this lesion also appears nonocclusive.  All 3 stents are patent.  LV: Left ventriculogram performed in 30 JONES projection revealed normal global and regional left ventricular systolic function with estimated ejection fraction of 55%.  No mitral regurgitation was noted.    Complications: No acute procedure related complications.      I personally viewed and interpreted the patient's EKG/Telemetry/lab data    Procedures    [facility]  reports that he has never smoked. He has never used smokeless tobacco.     Advance Care Planning   Advance Care Planning: ACP discussion was declined by the patient. Patient does not have an advance directive, information provided.     Assessment & Plan    1. AV block, complete  S/p ICD    2. ICD (implantable cardioverter-defibrillator), dual, in situ  The patient's [employer] [place] dual-chamber ICD implanted by Dr. Wise at [facility] in November 2024 demonstrate normal device function with over 8 years left on the battery, 3.1% atrial pacing, dependent with >99% ventricular pacing, acceptable threshold impedance values and about 2% mode switching.  No reprogramming today.  His [place] did show about 23 days of worsening heart failure in early March and early April but this has improved and patient endorses no heart failure symptoms.      3. VT (ventricular tachycardia)  Reportedly inducible on EP study at [facility].  No recurrence on device interrogation today    4. Paroxysmal atrial fibrillation  Recent diagnosis.  Patient is now on Eliquis 5 mg twice daily for stroke prophylaxis.  Asymptomatic.   About 2% mode switch on device as noted above    5. Sarcoidosis  Patient definitive diagnosis of pulmonary sarcoidosis but we have no records of cardiac PET scan.  We will try to set this up for definitive diagnosis of sarcoidosis with cardiac involvement.      Stable cardiac course.  We will order repeat echocardiogram after next appointment in 6 months        Follow Up:  Return in about 6 months (around 11/2/2025).      Thank you for allowing me to participate in the care of your patient. Please do not hesitate to contact me with additional questions or concerns.      Michael Dupont PA-C  Cardiac Electrophysiology  [facility] / [facility]

## 2025-05-04 PROBLEM — I47.20 VT (VENTRICULAR TACHYCARDIA): Status: ACTIVE | Noted: 2025-05-04

## 2025-05-04 PROBLEM — Z95.810 ICD (IMPLANTABLE CARDIOVERTER-DEFIBRILLATOR), DUAL, IN SITU: Status: ACTIVE | Noted: 2025-05-04

## 2025-05-04 PROBLEM — I44.2 AV BLOCK, COMPLETE: Status: ACTIVE | Noted: 2025-05-04

## 2025-05-14 ENCOUNTER — TELEPHONE (OUTPATIENT)
Dept: CARDIOLOGY | Facility: CLINIC | Age: 60
End: 2025-05-14
Payer: COMMERCIAL

## 2025-05-14 NOTE — TELEPHONE ENCOUNTER
Received VM from pt stating he went to the ER 5/12 for pain around his breast and pacemaker site. Called pt to discuss and phone only rang. Will try again later.     Faxed request for ER D/C summary to [facility].

## 2025-05-15 NOTE — TELEPHONE ENCOUNTER
Received ER record. Spoke with pt. States he didn't know what was going on the other day, but the pain in chest was worse than usual so he went to get checked out. Per record it resolved with morphine. Pt states it's back to his baseline. States the pain feels like musculoskeletal pain. Offered to make sooner appt but pt declines at this time, and states he will call if the pain happens like that again. Discussed he has NTG and he can try using that if the pain comes on again. Pt states he does not have sarcoidosis in his heart. Was told in 2012 that it was in his lungs but had gone into remission and it was nowhere else in his body. States had heart cath x2 with stents in Jan 2025. Added record to [facility].

## 2025-05-22 ENCOUNTER — TELEPHONE (OUTPATIENT)
Dept: CARDIOLOGY | Facility: CLINIC | Age: 60
End: 2025-05-22
Payer: COMMERCIAL

## 2025-05-22 NOTE — TELEPHONE ENCOUNTER
"Returned VM message to pt concerning BP 80s/50 after a walk, and he stopped taking losartan. Pt states since stopping losartan his /70s today after a 2 mile walk.   States he was started on losartan at last office visit b/c BP elevated in the office, but at home it runs on the lower side. Discussed \"white coat syndrome\". Pt checking his BP a few times a day at home.   5/15 BP 96/58  5/16 118/67  Advised to continue to monitor BP and s/s and to keep a log of readings. Discussed when to check BP. Encouraged to reach out again for further concerns/questions. He voiced understanding.   "

## 2025-06-03 RX ORDER — CLOPIDOGREL BISULFATE 75 MG/1
75 TABLET ORAL DAILY
Qty: 30 TABLET | Refills: 11 | Status: SHIPPED | OUTPATIENT
Start: 2025-06-03 | End: 2026-06-03

## 2025-07-21 RX ORDER — ISOSORBIDE MONONITRATE 60 MG/1
60 TABLET, EXTENDED RELEASE ORAL DAILY
Qty: 90 TABLET | Refills: 3 | Status: SHIPPED | OUTPATIENT
Start: 2025-07-21 | End: 2026-07-21

## 2025-08-14 LAB
MC_CV_MDC_IDC_RATE_1: 182
MC_CV_MDC_IDC_RATE_1: 222
MC_CV_MDC_IDC_SHOCK_MEASURED_IMPEDANCE: 75
MC_CV_MDC_IDC_THERAPIES: NORMAL
MC_CV_MDC_IDC_THERAPIES: NORMAL
MC_CV_MDC_IDC_ZONE_ID: 1
MC_CV_MDC_IDC_ZONE_ID: 2
MC_CV_MDC_IDC_ZONE_ID: 3
MDC_IDC_MSMT_BATTERY_REMAINING_LONGEVITY: 93 MO
MDC_IDC_MSMT_BATTERY_REMAINING_PERCENTAGE: 86 %
MDC_IDC_MSMT_BATTERY_RRT_TRIGGER: 2.62
MDC_IDC_MSMT_BATTERY_STATUS: NORMAL
MDC_IDC_MSMT_BATTERY_VOLTAGE: 3.01
MDC_IDC_MSMT_CAP_CHARGE_TIME: 8.9
MDC_IDC_MSMT_LEADCHNL_RA_DTM: NORMAL
MDC_IDC_MSMT_LEADCHNL_RA_IMPEDANCE_VALUE: 390
MDC_IDC_MSMT_LEADCHNL_RA_PACING_THRESHOLD_AMPLITUDE: 0.88
MDC_IDC_MSMT_LEADCHNL_RA_PACING_THRESHOLD_POLARITY: NORMAL
MDC_IDC_MSMT_LEADCHNL_RA_PACING_THRESHOLD_PULSEWIDTH: 0.5
MDC_IDC_MSMT_LEADCHNL_RA_SENSING_INTR_AMPL: 5
MDC_IDC_MSMT_LEADCHNL_RV_DTM: NORMAL
MDC_IDC_MSMT_LEADCHNL_RV_IMPEDANCE_VALUE: 450
MDC_IDC_MSMT_LEADCHNL_RV_PACING_THRESHOLD_AMPLITUDE: 1
MDC_IDC_MSMT_LEADCHNL_RV_PACING_THRESHOLD_POLARITY: NORMAL
MDC_IDC_MSMT_LEADCHNL_RV_PACING_THRESHOLD_PULSEWIDTH: 0.5
MDC_IDC_PG_IMPLANT_DTM: NORMAL
MDC_IDC_PG_MFG: NORMAL
MDC_IDC_PG_MODEL: NORMAL
MDC_IDC_PG_SERIAL: NORMAL
MDC_IDC_PG_TYPE: NORMAL
MDC_IDC_SESS_DTM: NORMAL
MDC_IDC_SESS_TYPE: NORMAL
MDC_IDC_SET_BRADY_AT_MODE_SWITCH_RATE: 180
MDC_IDC_SET_BRADY_LOWRATE: 60
MDC_IDC_SET_BRADY_MAX_SENSOR_RATE: 130
MDC_IDC_SET_BRADY_MAX_TRACKING_RATE: 130
MDC_IDC_SET_BRADY_MODE: NORMAL
MDC_IDC_SET_BRADY_PAV_DELAY: 180
MDC_IDC_SET_BRADY_SAV_DELAY: 130
MDC_IDC_SET_LEADCHNL_RA_PACING_AMPLITUDE: 1.88
MDC_IDC_SET_LEADCHNL_RA_PACING_POLARITY: NORMAL
MDC_IDC_SET_LEADCHNL_RA_PACING_PULSEWIDTH: 0.5
MDC_IDC_SET_LEADCHNL_RA_SENSING_POLARITY: NORMAL
MDC_IDC_SET_LEADCHNL_RA_SENSING_SENSITIVITY: 0.3
MDC_IDC_SET_LEADCHNL_RV_PACING_AMPLITUDE: 1.25
MDC_IDC_SET_LEADCHNL_RV_PACING_POLARITY: NORMAL
MDC_IDC_SET_LEADCHNL_RV_PACING_PULSEWIDTH: 0.5
MDC_IDC_SET_LEADCHNL_RV_SENSING_POLARITY: NORMAL
MDC_IDC_SET_LEADCHNL_RV_SENSING_SENSITIVITY: 0.5
MDC_IDC_SET_ZONE_STATUS: NORMAL
MDC_IDC_SET_ZONE_TYPE: NORMAL
MDC_IDC_STAT_AT_BURDEN_PERCENT: 0
MDC_IDC_STAT_BRADY_RA_PERCENT_PACED: 4.9
MDC_IDC_STAT_BRADY_RV_PERCENT_PACED: 99
MDC_IDC_STAT_TACHYTHERAPY_ATP_DELIVERED_RECENT: 0
MDC_IDC_STAT_TACHYTHERAPY_SHOCKS_ABORTED_RECENT: 0
MDC_IDC_STAT_TACHYTHERAPY_SHOCKS_DELIVERED_RECENT: 0

## (undated) DEVICE — TR BAND RADIAL ARTERY COMPRESSION DEVICE: Brand: TR BAND

## (undated) DEVICE — INTRO SHEATH PRELUDE IDEAL SPRNG COIL 021 6F 23X80CM

## (undated) DEVICE — MODEL AT P65, P/N 701554-001KIT CONTENTS: HAND CONTROLLER, 3-WAY HIGH-PRESSURE STOPCOCK WITH ROTATING END AND PREMIUM HIGH-PRESSURE TUBING: Brand: ANGIOTOUCH® KIT

## (undated) DEVICE — CATH DIAG EXPO .045 FL3  5F 100CM

## (undated) DEVICE — ST INF PRI SMRTSTE 20DRP 2VLV 24ML 117

## (undated) DEVICE — PK CATH CARD 10

## (undated) DEVICE — MODEL BT2000 P/N 700287-012KIT CONTENTS: MANIFOLD WITH SALINE AND CONTRAST PORTS, SALINE TUBING WITH SPIKE AND HAND SYRINGE, TRANSDUCER: Brand: BT2000 AUTOMATED MANIFOLD KIT

## (undated) DEVICE — GW INQWIRE FC PTFE STD J/1.5 .035 260

## (undated) DEVICE — ADULT, W/LG. BACK PAD, RADIOTRANSPARENT ELEMENT AND LEAD WIRE COMPATIBLE W/: Brand: DEFIBRILLATION ELECTRODES

## (undated) DEVICE — GUIDE CATHETER: Brand: MACH1™

## (undated) DEVICE — ST EXT IV SMRTSTE 2VLV FIX M LL 6ML 41

## (undated) DEVICE — CATH DIAG EXPO M/ PK 5F FL4/FR4 PIG

## (undated) DEVICE — Device: Brand: OMNIWIRE PRESSURE GUIDE WIRE